# Patient Record
Sex: FEMALE | Race: WHITE | NOT HISPANIC OR LATINO | Employment: FULL TIME | URBAN - NONMETROPOLITAN AREA
[De-identification: names, ages, dates, MRNs, and addresses within clinical notes are randomized per-mention and may not be internally consistent; named-entity substitution may affect disease eponyms.]

---
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
## 2022-03-17 ENCOUNTER — HOSPITAL ENCOUNTER (EMERGENCY)
Facility: HOSPITAL | Age: 22
Discharge: HOME | End: 2022-03-17
Attending: EMERGENCY MEDICINE | Admitting: EMERGENCY MEDICINE
Payer: COMMERCIAL

## 2022-03-17 VITALS
HEART RATE: 101 BPM | DIASTOLIC BLOOD PRESSURE: 82 MMHG | OXYGEN SATURATION: 98 % | TEMPERATURE: 98.2 F | RESPIRATION RATE: 18 BRPM | SYSTOLIC BLOOD PRESSURE: 138 MMHG | WEIGHT: 191.8 LBS | HEIGHT: 68 IN | BODY MASS INDEX: 29.07 KG/M2

## 2022-03-17 DIAGNOSIS — K08.89 DENTALGIA: Primary | ICD-10-CM

## 2022-03-17 PROCEDURE — 99284 EMERGENCY DEPT VISIT MOD MDM: CPT | Performed by: EMERGENCY MEDICINE

## 2022-03-17 PROCEDURE — 99282 EMERGENCY DEPT VISIT SF MDM: CPT

## 2022-03-17 RX ORDER — AMOXICILLIN AND CLAVULANATE POTASSIUM 875; 125 MG/1; MG/1
1 TABLET, FILM COATED ORAL ONCE
Status: COMPLETED | OUTPATIENT
Start: 2022-03-17 | End: 2022-03-17

## 2022-03-17 RX ORDER — CHLORHEXIDINE GLUCONATE 0.12 MG/ML
15 RINSE ORAL 2 TIMES DAILY
Qty: 473 ML | Refills: 0 | Status: SHIPPED | OUTPATIENT
Start: 2022-03-17 | End: 2022-05-19

## 2022-03-17 RX ORDER — AMOXICILLIN AND CLAVULANATE POTASSIUM 875; 125 MG/1; MG/1
1 TABLET, FILM COATED ORAL EVERY 12 HOURS
Qty: 14 TABLET | Refills: 0 | Status: SHIPPED | OUTPATIENT
Start: 2022-03-17 | End: 2022-03-24

## 2022-03-17 RX ADMIN — AMOXICILLIN AND CLAVULANATE POTASSIUM 1 TABLET: 875; 125 TABLET, FILM COATED ORAL at 15:30

## 2022-03-17 NOTE — ED PROVIDER NOTES
History  Chief Complaint   Patient presents with    Dental Pain     patient states her wisdom teeth are starting to come in  patient was feeling her gum when she felt "a blister pop" Called dentist, sent to the ER     61-year-old female presents for evaluation of left lower dental pain  Patient reports the last few days she has had pain in this area, she states she is getting her wisdom teeth in  Patient noticed skin like material over her tooth in that area, she states yesterday she felt like this skin ruptured she had a passing bad taste in her mouth  Today pain persisted and is now radiating up and down  Patient called her dentist to was unable to see her for an emergency visit and recommended going to the emergency department  Patient reports difficulty eating on that side due to pain she denies external swelling or skin discolorations  Patient further denies difficulty swallowing, moving her neck, fevers or chills, nausea or vomiting  Prior to Admission Medications   Prescriptions Last Dose Informant Patient Reported? Taking?   desoximetasone (TOPICORT) 0 05 % cream   No No   Sig: Apply topically 2 (two) times a day   Patient not taking: Reported on 10/19/2021   drospirenone-ethinyl estradiol (HOLGER) 3-0 02 MG per tablet   Yes No   meloxicam (MOBIC) 15 mg tablet   No No   Sig: Take 1 tablet (15 mg total) by mouth daily   Patient not taking: Reported on 10/19/2021   methocarbamol (ROBAXIN) 500 mg tablet   No No   Sig: Take 1 tablet (500 mg total) by mouth 3 (three) times a day as needed for muscle spasms   methylPREDNISolone 4 MG tablet therapy pack   No No   Sig: Use as directed on package   Patient not taking: Reported on 11/14/2021       Facility-Administered Medications: None       History reviewed  No pertinent past medical history  History reviewed  No pertinent surgical history  History reviewed  No pertinent family history    I have reviewed and agree with the history as documented  E-Cigarette/Vaping    E-Cigarette Use Never User      E-Cigarette/Vaping Substances    Nicotine No     THC No     CBD No     Flavoring No     Other No     Unknown No      Social History     Tobacco Use    Smoking status: Never Smoker    Smokeless tobacco: Never Used   Vaping Use    Vaping Use: Never used   Substance Use Topics    Alcohol use: Yes     Comment: socially    Drug use: Never       Review of Systems    Physical Exam  Physical Exam  Vitals reviewed  Constitutional:       General: She is not in acute distress  Appearance: Normal appearance  She is not ill-appearing, toxic-appearing or diaphoretic  HENT:      Head: Normocephalic and atraumatic  Right Ear: External ear normal       Left Ear: External ear normal       Mouth/Throat:      Lips: Pink  Mouth: Mucous membranes are moist       Dentition: Dental tenderness present  No dental caries or dental abscesses  Tongue: No lesions  Palate: No mass and lesions  Pharynx: Oropharynx is clear  Uvula midline  No pharyngeal swelling, oropharyngeal exudate, posterior oropharyngeal erythema or uvula swelling  Tonsils: No tonsillar exudate or tonsillar abscesses  Comments: Tenderness to left lower molar, gum intrusion to middle of tooth from posterior as seen on right tooth; no palpable or visible abscess; soft sublingual and submandibular areas; no trismus  Eyes:      General:         Right eye: No discharge  Left eye: No discharge  Extraocular Movements: Extraocular movements intact  Cardiovascular:      Rate and Rhythm: Normal rate  Pulmonary:      Effort: Pulmonary effort is normal  No respiratory distress  Musculoskeletal:         General: No deformity or signs of injury  Cervical back: No rigidity or tenderness  Right lower leg: No edema  Left lower leg: No edema  Lymphadenopathy:      Cervical: No cervical adenopathy  Skin:     General: Skin is warm  Coloration: Skin is not jaundiced or pale  Neurological:      General: No focal deficit present  Mental Status: She is alert  Mental status is at baseline  Vital Signs  ED Triage Vitals [03/17/22 1459]   Temperature Pulse Respirations Blood Pressure SpO2   98 2 °F (36 8 °C) 101 18 138/82 98 %      Temp Source Heart Rate Source Patient Position - Orthostatic VS BP Location FiO2 (%)   Temporal Monitor Sitting Right arm --      Pain Score       5           Vitals:    03/17/22 1459   BP: 138/82   Pulse: 101   Patient Position - Orthostatic VS: Sitting         Visual Acuity      ED Medications  Medications   amoxicillin-clavulanate (AUGMENTIN) 875-125 mg per tablet 1 tablet (1 tablet Oral Given 3/17/22 1530)       Diagnostic Studies  Results Reviewed     None                 No orders to display              Procedures  Procedures         ED Course                                             MDM  Number of Diagnoses or Management Options  Dentalgia  Diagnosis management comments: 23Year old female presents for evaluation of dentalgia  Patient is well-appearing on examination, no palpable or visual abscess  Disposition  Final diagnoses:   Cande Confer     Time reflects when diagnosis was documented in both MDM as applicable and the Disposition within this note     Time User Action Codes Description Comment    3/17/2022  3:20 PM Marguerite Crawford Add [F95 [id number]] Cande Confer       ED Disposition     ED Disposition Condition Date/Time Comment    Discharge Stable Thu Mar 17, 2022  3:20 PM Anthony Rogers discharge to home/self care              Follow-up Information    None         Discharge Medication List as of 3/17/2022  3:21 PM      START taking these medications    Details   amoxicillin-clavulanate (AUGMENTIN) 875-125 mg per tablet Take 1 tablet by mouth every 12 (twelve) hours for 7 days, Starting Thu 3/17/2022, Until Thu 3/24/2022, Normal      chlorhexidine (PERIDEX) 0 12 % solution Apply 15 mL to the mouth or throat 2 (two) times a day, Starting u 3/17/2022, Normal         CONTINUE these medications which have NOT CHANGED    Details   desoximetasone (TOPICORT) 0 05 % cream Apply topically 2 (two) times a day, Starting Mon 4/5/2021, Normal      drospirenone-ethinyl estradiol (HOLGER) 3-0 02 MG per tablet Starting Thu 11/19/2020, Historical Med      meloxicam (MOBIC) 15 mg tablet Take 1 tablet (15 mg total) by mouth daily, Starting Mon 4/5/2021, Normal      methocarbamol (ROBAXIN) 500 mg tablet Take 1 tablet (500 mg total) by mouth 3 (three) times a day as needed for muscle spasms, Starting Sun 11/14/2021, Normal      methylPREDNISolone 4 MG tablet therapy pack Use as directed on package, Normal             No discharge procedures on file      PDMP Review     None          ED Provider  Electronically Signed by           Alison Paredes DO  03/17/22 6236

## 2022-05-19 ENCOUNTER — OFFICE VISIT (OUTPATIENT)
Dept: FAMILY MEDICINE CLINIC | Facility: CLINIC | Age: 22
End: 2022-05-19
Payer: COMMERCIAL

## 2022-05-19 VITALS
TEMPERATURE: 98.8 F | SYSTOLIC BLOOD PRESSURE: 142 MMHG | WEIGHT: 195.2 LBS | DIASTOLIC BLOOD PRESSURE: 80 MMHG | OXYGEN SATURATION: 97 % | HEART RATE: 97 BPM | RESPIRATION RATE: 16 BRPM | BODY MASS INDEX: 29.58 KG/M2 | HEIGHT: 68 IN

## 2022-05-19 DIAGNOSIS — M54.2 NECK PAIN, CHRONIC: ICD-10-CM

## 2022-05-19 DIAGNOSIS — Z30.41 ENCOUNTER FOR SURVEILLANCE OF CONTRACEPTIVE PILLS: ICD-10-CM

## 2022-05-19 DIAGNOSIS — Z23 IMMUNIZATION DUE: ICD-10-CM

## 2022-05-19 DIAGNOSIS — M54.6 CHRONIC MIDLINE THORACIC BACK PAIN: ICD-10-CM

## 2022-05-19 DIAGNOSIS — Z11.4 SCREENING FOR HIV WITHOUT PRESENCE OF RISK FACTORS: ICD-10-CM

## 2022-05-19 DIAGNOSIS — Z00.00 ANNUAL PHYSICAL EXAM: ICD-10-CM

## 2022-05-19 DIAGNOSIS — G89.29 NECK PAIN, CHRONIC: ICD-10-CM

## 2022-05-19 DIAGNOSIS — L98.9 SKIN LESION: ICD-10-CM

## 2022-05-19 DIAGNOSIS — Z11.59 ENCOUNTER FOR HEPATITIS C SCREENING TEST FOR LOW RISK PATIENT: ICD-10-CM

## 2022-05-19 DIAGNOSIS — M42.00 SCHEUERMANN'S DISEASE: ICD-10-CM

## 2022-05-19 DIAGNOSIS — Z11.8 ENCOUNTER FOR SCREENING EXAMINATION FOR CHLAMYDIAL INFECTION: Primary | ICD-10-CM

## 2022-05-19 DIAGNOSIS — G89.29 CHRONIC MIDLINE THORACIC BACK PAIN: ICD-10-CM

## 2022-05-19 PROCEDURE — T1015 CLINIC SERVICE: HCPCS | Performed by: FAMILY MEDICINE

## 2022-05-19 RX ORDER — AMOXICILLIN 500 MG/1
500 TABLET, FILM COATED ORAL 3 TIMES DAILY
COMMUNITY
Start: 2022-03-25 | End: 2022-05-19

## 2022-05-19 RX ORDER — ACETAMINOPHEN AND CODEINE PHOSPHATE 300; 30 MG/1; MG/1
1 TABLET ORAL EVERY 4 HOURS PRN
COMMUNITY
Start: 2022-03-25 | End: 2022-05-19

## 2022-05-19 RX ORDER — IBUPROFEN 600 MG/1
600 TABLET ORAL EVERY 6 HOURS PRN
COMMUNITY
Start: 2022-03-25 | End: 2022-05-19

## 2022-05-19 NOTE — PROGRESS NOTES
SBAR shift report received from [facility]. Pt stable. Assessment complete. Pt is lying in bed, resting quietly. Resp even, unlabored. Pt is alert, orient X 3. Pt appears in no acute distress at this time. Pt is on 4L high flow nasal cannula 02. Pt has wound vac to R upper chest on back side. No visible lice noted this AM when looking through pt's head. Pt encouraged to call for assistance, call light in reach. Safety measures in place.  Will continue to monitor [employer]    NAME: Blaze Galan  AGE: 23 y o  SEX: female  : 2000     DATE: 2022     Assessment and Plan:     Problem List Items Addressed This Visit    None     Visit Diagnoses     Encounter for screening examination for chlamydial infection    -  Primary    Relevant Orders    Chlamydia/GC amplified DNA by PCR    Screening for HIV without presence of risk factors        Relevant Orders    HIV 1/2 ANTIGEN/ANTIBODY (4TH GENERATION) W REFLEX [facility]    Encounter for hepatitis C screening test for low risk patient        Relevant Orders    Hepatitis C antibody    Immunization due        Relevant Orders    HPV VACCINE BIVALENT 3 DOSE IM (Completed)    Annual physical exam        BMI 29 0-29 9,adult        Scheuermann's disease        Relevant Orders    Ambulatory Referral to Orthopedic Surgery    Ambulatory Referral to Physical Therapy    Chronic midline thoracic back pain        Relevant Orders    Ambulatory Referral to Orthopedic Surgery    Ambulatory Referral to Physical Therapy    Encounter for surveillance of contraceptive pills        Relevant Orders    Ambulatory Referral to Gynecology    Skin lesion        Relevant Orders    Ambulatory Referral to Dermatology    Neck pain, chronic        Relevant Orders    Ambulatory Referral to Physical Therapy        -Reviewed conservative treatment for neck/back pain  Recommend PT and ortho eval for scheuermann's disese   -Labs/urine as ordered  -HPV #1 given today   -RTC 2 months for HPV #2 and follow up of back/neck pain or sooner if concerns arise    Immunizations and preventive care screenings were discussed with patient today  Appropriate education was printed on patient's after visit summary  Counseling:  Alcohol/drug use: discussed moderation in alcohol intake, the recommendations for healthy alcohol use, and avoidance of illicit drug use    Dental Health: discussed importance of regular tooth brushing, flossing, and dental visits  Injury prevention: discussed safety/seat belts, safety helmets, smoke detectors, carbon dioxide detectors, and smoking near bedding or upholstery  Sexual health: discussed sexually transmitted diseases, partner selection, use of condoms, avoidance of unintended pregnancy, and contraceptive alternatives  · Exercise: the importance of regular exercise/physical activity was discussed  Recommend exercise 3-5 times per week for at least 30 minutes  BMI Counseling: Body mass index is 29 68 kg/m²  The BMI is above normal  Nutrition recommendations include decreasing portion sizes, encouraging healthy choices of fruits and vegetables, decreasing fast food intake, consuming healthier snacks, limiting drinks that contain sugar, moderation in carbohydrate intake, increasing intake of lean protein, reducing intake of saturated and trans fat and reducing intake of cholesterol  Exercise recommendations include moderate physical activity 150 minutes/week, exercising 3-5 times per week and obtaining a gym membership  Rationale for BMI follow-up plan is due to patient being overweight or obese  Depression Screening and Follow-up Plan: Patient was screened for depression during today's encounter  They screened negative with a PHQ-2 score of 0  Return in 1 year (on 5/19/2023)  Chief Complaint:     Chief Complaint   Patient presents with    Physical Exam     Establish care with practice      History of Present Illness:     Adult Annual Physical   Patient here for a comprehensive physical exam  The patient reports on going neck and back pain    Notes this has stemmed from an [address] in oct 2021  Notes being prescribed multiple NSAIDs, which do provided some relief, however, she is hesitant with taking medications  Had XRs done in nov 2021 with concern for Scheuermann's disease   She reports most of her pain is in the thoracis spine/ribs and right side of her neck  Requests referral to GYN for management of OCPS  States she is currently getting through an online provider  Requests referral for dermatology for evaluation/removal of a lesion to her right elbow  Notes she fell in January 2021 and developed a scar, however, over the past few weeks as noticed small white blisters appearing on it  Previously saw [facility] in [facility], requesting referral      Diet and Physical Activity  · Diet/Nutrition: well balanced diet  · Exercise: 5-7 times a week on average and 30-60 minutes on average  Depression Screening  PHQ-2/9 Depression Screening    Little interest or pleasure in doing things: 0 - not at all  Feeling down, depressed, or hopeless: 0 - not at all  PHQ-2 Score: 0  PHQ-2 Interpretation: Negative depression screen       General Health  · Sleep: sleeps well  · Hearing: normal - bilateral   · Vision: goes for regular eye exams, wears glasses, wears contacts and follows with eye doctor at [facility]  · Dental: has appointment with [facility] Dec 2022  brushes teeth  /GYN Health  · Last menstrual period: 5/19/2022  · Contraceptive method: oral contraceptives  · History of STDs?: no   · Sexually active, uses condoms  Review of Systems:     Review of Systems   Constitutional: Negative  HENT: Negative  Respiratory: Negative  Cardiovascular: Negative  Gastrointestinal: Negative  Musculoskeletal: Positive for back pain and neck pain  Negative for arthralgias and myalgias  Skin: Positive for wound  Neurological: Negative  Psychiatric/Behavioral: Negative  Past Medical History:     Past Medical History:   Diagnosis Date    Allergic     Back pain     Neck pain       Past Surgical History:     History reviewed  No pertinent surgical history     Social History:     Social History     Socioeconomic History    Marital status: Single     Spouse name: None    Number of children: None    Years of education: None    Highest education level: None   Occupational History    None   Tobacco Use    Smoking status: Never Smoker    Smokeless tobacco: Never Used   Vaping Use    Vaping Use: Never used   Substance and Sexual Activity    Alcohol use: Yes     Comment: socially    Drug use: Never    Sexual activity: Yes     Partners: Male   Other Topics Concern    None   Social History Narrative    None     Social Determinants of Health     Financial Resource Strain: Not on file   Food Insecurity: Not on file   Transportation Needs: Not on file   Physical Activity: Not on file   Stress: Not on file   Social Connections: Not on file   Intimate Partner Violence: Not on file   Housing Stability: Not on file      Family History:     History reviewed  No pertinent family history  Current Medications:     Current Outpatient Medications   Medication Sig Dispense Refill    drospirenone-ethinyl estradiol (HOLGER) 3-0 02 MG per tablet        No current facility-administered medications for this visit  Allergies:     No Known Allergies   Physical Exam:     /80   Pulse 97   Temp 98 8 °F (37 1 °C)   Resp 16   Ht 5' 8" (1 727 m)   Wt 88 5 kg (195 lb 3 2 oz)   SpO2 97%   BMI 29 68 kg/m²     Physical Exam  Vitals and nursing note reviewed  Constitutional:       General: She is not in acute distress  Appearance: Normal appearance  She is well-developed  She is not ill-appearing  HENT:      Head: Normocephalic and atraumatic  Eyes:      Conjunctiva/sclera: Conjunctivae normal    Cardiovascular:      Rate and Rhythm: Normal rate and regular rhythm  Pulses: Normal pulses  Heart sounds: No murmur heard  Pulmonary:      Effort: Pulmonary effort is normal  No respiratory distress  Breath sounds: Normal breath sounds  No stridor  No wheezing, rhonchi or rales  Abdominal:      Palpations: Abdomen is soft  There is no mass  Tenderness: There is no abdominal tenderness  Hernia: No hernia is present  Musculoskeletal:      Cervical back: Neck supple  Spasms and tenderness present  No bony tenderness  Thoracic back: Normal       Lumbar back: Normal         Back:       Right lower leg: No edema  Left lower leg: No edema  Skin:     General: Skin is warm and dry  Capillary Refill: Capillary refill takes less than 2 seconds  Neurological:      Mental Status: She is alert and oriented to person, place, and time  Mental status is at baseline  Psychiatric:         Mood and Affect: Mood normal          Behavior: Behavior normal          Thought Content:  Thought content normal          Judgment: Judgment normal           Nichole Cuevas PA-C   [address]

## 2022-05-19 NOTE — PATIENT INSTRUCTIONS

## 2022-06-09 ENCOUNTER — TELEPHONE (OUTPATIENT)
Dept: OBGYN CLINIC | Facility: CLINIC | Age: 22
End: 2022-06-09

## 2022-06-10 ENCOUNTER — OFFICE VISIT (OUTPATIENT)
Dept: OBGYN CLINIC | Facility: CLINIC | Age: 22
End: 2022-06-10
Payer: COMMERCIAL

## 2022-06-10 ENCOUNTER — APPOINTMENT (OUTPATIENT)
Dept: RADIOLOGY | Facility: CLINIC | Age: 22
End: 2022-06-10
Payer: COMMERCIAL

## 2022-06-10 VITALS
TEMPERATURE: 98 F | DIASTOLIC BLOOD PRESSURE: 83 MMHG | WEIGHT: 191 LBS | HEART RATE: 106 BPM | HEIGHT: 68 IN | SYSTOLIC BLOOD PRESSURE: 121 MMHG | BODY MASS INDEX: 28.95 KG/M2

## 2022-06-10 DIAGNOSIS — G89.29 CHRONIC MIDLINE THORACIC BACK PAIN: ICD-10-CM

## 2022-06-10 DIAGNOSIS — M54.2 CERVICALGIA: ICD-10-CM

## 2022-06-10 DIAGNOSIS — M54.50 CHRONIC RIGHT-SIDED LOW BACK PAIN WITHOUT SCIATICA: ICD-10-CM

## 2022-06-10 DIAGNOSIS — G89.29 CHRONIC BILATERAL THORACIC BACK PAIN: ICD-10-CM

## 2022-06-10 DIAGNOSIS — G89.29 CHRONIC RIGHT-SIDED LOW BACK PAIN WITHOUT SCIATICA: ICD-10-CM

## 2022-06-10 DIAGNOSIS — M54.6 CHRONIC BILATERAL THORACIC BACK PAIN: ICD-10-CM

## 2022-06-10 DIAGNOSIS — M54.6 CHRONIC MIDLINE THORACIC BACK PAIN: ICD-10-CM

## 2022-06-10 DIAGNOSIS — S19.9XXA INJURY OF NECK, INITIAL ENCOUNTER: ICD-10-CM

## 2022-06-10 DIAGNOSIS — S39.92XA INJURY OF LOW BACK, INITIAL ENCOUNTER: ICD-10-CM

## 2022-06-10 PROCEDURE — 1036F TOBACCO NON-USER: CPT | Performed by: FAMILY MEDICINE

## 2022-06-10 PROCEDURE — 3008F BODY MASS INDEX DOCD: CPT | Performed by: FAMILY MEDICINE

## 2022-06-10 PROCEDURE — 72070 X-RAY EXAM THORAC SPINE 2VWS: CPT

## 2022-06-10 PROCEDURE — 99244 OFF/OP CNSLTJ NEW/EST MOD 40: CPT | Performed by: FAMILY MEDICINE
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
## 2022-06-16 ENCOUNTER — OFFICE VISIT (OUTPATIENT)
Dept: DENTISTRY | Facility: CLINIC | Age: 22
End: 2022-06-16
Payer: COMMERCIAL

## 2022-06-16 VITALS — DIASTOLIC BLOOD PRESSURE: 87 MMHG | SYSTOLIC BLOOD PRESSURE: 129 MMHG | HEART RATE: 83 BPM

## 2022-06-16 DIAGNOSIS — K02.9 TOOTH DECAY: Primary | ICD-10-CM

## 2022-06-16 PROCEDURE — D0210 INTRAORAL - COMPLETE SERIES OF RADIOGRAPHIC IMAGES: HCPCS | Performed by: STUDENT IN AN ORGANIZED HEALTH CARE EDUCATION/TRAINING PROGRAM

## 2022-06-16 PROCEDURE — D0150 COMPREHENSIVE ORAL EVALUATION - NEW OR ESTABLISHED PATIENT: HCPCS | Performed by: STUDENT IN AN ORGANIZED HEALTH CARE EDUCATION/TRAINING PROGRAM

## 2022-06-16 NOTE — PROGRESS NOTES
Comprehensive Exam    Marybeth Muro presents for a comprehensive exam  Verbal consent for treatment given in addition to the forms  Reviewed health history - Patient is ASA    Consents signed: Yes    Perio: Gingivitis  Pain Scale: 0  Caries Assessment: high  Radiographs: [facility]    Oral Hygiene instruction reviewed and given  Hygiene recall visits recommended to the patient  Treatment Plan:  1  Periodontal therapy: prophy  2  Caries control: resins, diet change (talked about less soda consumption, less snacking, lowering exposure time to sugary drinks), using prevident  Prognosis is Good    Referrals needed: No  Next visit: Jeremiah

## 2022-06-21 ENCOUNTER — EVALUATION (OUTPATIENT)
Dept: PHYSICAL THERAPY | Facility: HOME HEALTHCARE | Age: 22
End: 2022-06-21
Payer: COMMERCIAL

## 2022-06-21 DIAGNOSIS — S19.9XXA UNSPECIFIED INJURY OF NECK, INITIAL ENCOUNTER: ICD-10-CM

## 2022-06-21 DIAGNOSIS — M54.2 CERVICALGIA: ICD-10-CM

## 2022-06-21 DIAGNOSIS — M54.50 LOW BACK PAIN, UNSPECIFIED BACK PAIN LATERALITY, UNSPECIFIED CHRONICITY, UNSPECIFIED WHETHER SCIATICA PRESENT: Primary | ICD-10-CM

## 2022-06-21 DIAGNOSIS — G89.29 OTHER CHRONIC PAIN: ICD-10-CM

## 2022-06-21 DIAGNOSIS — S39.92XA UNSPECIFIED INJURY OF LOWER BACK, INITIAL ENCOUNTER: ICD-10-CM

## 2022-06-21 DIAGNOSIS — M54.6 PAIN IN THORACIC SPINE: ICD-10-CM

## 2022-06-21 PROCEDURE — 97112 NEUROMUSCULAR REEDUCATION: CPT

## 2022-06-21 PROCEDURE — 97162 PT EVAL MOD COMPLEX 30 MIN: CPT

## 2022-06-21 PROCEDURE — 97140 MANUAL THERAPY 1/> REGIONS: CPT
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
## 2022-06-28 ENCOUNTER — APPOINTMENT (OUTPATIENT)
Dept: PHYSICAL THERAPY | Facility: HOME HEALTHCARE | Age: 22
End: 2022-06-28
Payer: COMMERCIAL

## 2022-07-14 ENCOUNTER — OFFICE VISIT (OUTPATIENT)
Dept: DENTISTRY | Facility: CLINIC | Age: 22
End: 2022-07-14
Payer: COMMERCIAL

## 2022-07-14 VITALS — SYSTOLIC BLOOD PRESSURE: 146 MMHG | HEART RATE: 90 BPM | DIASTOLIC BLOOD PRESSURE: 84 MMHG

## 2022-07-14 DIAGNOSIS — K02.9 TOOTH DECAY: Primary | ICD-10-CM

## 2022-07-14 PROCEDURE — D2393 RESIN-BASED COMPOSITE - 3 SURFACES, POSTERIOR: HCPCS | Performed by: STUDENT IN AN ORGANIZED HEALTH CARE EDUCATION/TRAINING PROGRAM

## 2022-07-14 PROCEDURE — D2392 RESIN-BASED COMPOSITE - 2 SURFACES, POSTERIOR: HCPCS | Performed by: STUDENT IN AN ORGANIZED HEALTH CARE EDUCATION/TRAINING PROGRAM

## 2022-07-14 PROCEDURE — D2391 RESIN-BASED COMPOSITE - 1 SURFACE, POSTERIOR: HCPCS | Performed by: STUDENT IN AN ORGANIZED HEALTH CARE EDUCATION/TRAINING PROGRAM

## 2022-07-14 NOTE — PROGRESS NOTES
Composite Filling    Media Andrea presents for composite filling  PMH reviewed, no changes  Discussed with patient need for RCT if pulp exposure occurs or in future if pulp is inflamed  Pt understands and consents  Applied topical benzocaine, administered 2 carps 2% lido 1:100k epi via IANB and long buccal and 1 carps 4% articaine 1:100k epi via infilatration    Prepped tooth #2, 3, 4, 31, 30, 29 with 556 carbide on high speed  Caries removed with round carbide on slow speed  Placed sectional matrix  Isolation with cotton rolls and dri-angles    Etch with 37% H2PO4, rinse, dry  Applied Adhese with 20 second scrub once, gentle air dry and light cured for 10s  Restored with Tetric bulk abdi and beautifil flowable resin shade A2 and light cured  Refined with finishing burs, polished with enhance point  Verified occlusion and contacts  Pt left satisfied      NV: resins

## 2022-08-02 ENCOUNTER — APPOINTMENT (OUTPATIENT)
Dept: LAB | Facility: HOSPITAL | Age: 22
End: 2022-08-02
Payer: COMMERCIAL

## 2022-08-02 DIAGNOSIS — Z11.4 SCREENING FOR HIV WITHOUT PRESENCE OF RISK FACTORS: ICD-10-CM

## 2022-08-02 DIAGNOSIS — Z11.8 ENCOUNTER FOR SCREENING EXAMINATION FOR CHLAMYDIAL INFECTION: ICD-10-CM

## 2022-08-02 DIAGNOSIS — Z11.59 ENCOUNTER FOR HEPATITIS C SCREENING TEST FOR LOW RISK PATIENT: ICD-10-CM

## 2022-08-02 LAB — HCV AB SER QL: NORMAL

## 2022-08-02 PROCEDURE — 87591 N.GONORRHOEAE DNA AMP PROB: CPT

## 2022-08-02 PROCEDURE — 87491 CHLMYD TRACH DNA AMP PROBE: CPT

## 2022-08-02 PROCEDURE — 87389 HIV-1 AG W/HIV-1&-2 AB AG IA: CPT

## 2022-08-02 PROCEDURE — 86803 HEPATITIS C AB TEST: CPT

## 2022-08-02 PROCEDURE — 36415 COLL VENOUS BLD VENIPUNCTURE: CPT

## 2022-08-03 DIAGNOSIS — A74.9 CHLAMYDIA INFECTION: Primary | ICD-10-CM

## 2022-08-03 LAB
C TRACH DNA SPEC QL NAA+PROBE: POSITIVE
HIV 1+2 AB+HIV1 P24 AG SERPL QL IA: NORMAL
N GONORRHOEA DNA SPEC QL NAA+PROBE: NEGATIVE

## 2022-08-03 RX ORDER — DOXYCYCLINE HYCLATE 100 MG/1
100 CAPSULE ORAL EVERY 12 HOURS SCHEDULED
Qty: 14 CAPSULE | Refills: 0 | Status: SHIPPED | OUTPATIENT
Start: 2022-08-03 | End: 2022-08-10

## 2022-12-19 ENCOUNTER — OFFICE VISIT (OUTPATIENT)
Dept: DENTISTRY | Facility: CLINIC | Age: 22
End: 2022-12-19

## 2022-12-19 VITALS — DIASTOLIC BLOOD PRESSURE: 81 MMHG | SYSTOLIC BLOOD PRESSURE: 121 MMHG | HEART RATE: 80 BPM

## 2022-12-19 DIAGNOSIS — K02.9 TOOTH DECAY: Primary | ICD-10-CM

## 2022-12-19 NOTE — PROGRESS NOTES
Composite Filling    Denisenorma Bowers presents for composite filling  PMH reviewed, no changes  Discussed with patient need for RCT if pulp exposure occurs or in future if pulp is inflamed  Pt understands and consents  Applied topical benzocaine, administered 1 carps 2% lido 1:100k epi via IANB and long buccal    Prepped tooth #19, 18, 20 with 330 carbide on high speed  Caries removed with round carbide on slow speed  #19 buccal was deep  Isolation with cotton rolls and dri-angles    Etch with 37% H2PO4, rinse, dry  Applied Adhese with 20 second scrub once, gentle air dry and light cured for 10s  Restored with Tetric bulk abdi and beautifil flowable resin shade A2 and light cured  Refined with finishing burs, polished with enhance point  Verified occlusion and contacts  Pt left satisfied      NV: Resins

## 2022-12-20 ENCOUNTER — OFFICE VISIT (OUTPATIENT)
Dept: DENTISTRY | Facility: CLINIC | Age: 22
End: 2022-12-20

## 2022-12-20 VITALS — DIASTOLIC BLOOD PRESSURE: 80 MMHG | SYSTOLIC BLOOD PRESSURE: 124 MMHG

## 2022-12-20 DIAGNOSIS — K03.6 ACCRETIONS ON TEETH: Primary | ICD-10-CM

## 2022-12-20 NOTE — PROGRESS NOTES
Adult Prophy  Chief Complaint   Patient presents with   • Routine Oral Cleaning   Discussed sensitivity- pt would like to try paste but if it does not work we will try a Fl2 tx NV     Method Used:  · Prophy Method Used: Ultrasonic Scaling  · Hand Scaling  · Polished  · Flossed    Radiographs Taken in [facility]: (Taken to assess periodontal health)  · None     Intra/Extra Oral Cancer Screening:  · Within normal limits    Oral Hygiene:  · Fair    Plaque:  · Light    Calculus:  · Light  · Moderate  · Lower anteriors    Bleeding:  · Moderate    Stain:  · Light    Periodontal Charting:  · Spot probing    Periodontal Classification:  · Generalized  · Moderate  · Gingivitis    Nutritional Counseling:  · Discussed pH and the role it plays in decay    Oral Hygiene Instruction:    Went over daily routine and c-shaped flossing  Demonstrated in the mirror     Discussed whitening     No orders of the defined types were placed in this encounter         Next Visit:  6 Month prophy  Dentist visit- resins

## 2022-12-22 ENCOUNTER — OFFICE VISIT (OUTPATIENT)
Dept: DENTISTRY | Facility: CLINIC | Age: 22
End: 2022-12-22

## 2022-12-22 VITALS — SYSTOLIC BLOOD PRESSURE: 119 MMHG | HEART RATE: 73 BPM | DIASTOLIC BLOOD PRESSURE: 81 MMHG

## 2022-12-22 DIAGNOSIS — K02.9 TOOTH DECAY: Primary | ICD-10-CM

## 2022-12-22 NOTE — PROGRESS NOTES
Composite Filling    Aydee Kinsey presents for composite filling  PMH reviewed, no changes  Discussed with patient need for RCT if pulp exposure occurs or in future if pulp is inflamed  Pt understands and consents  Applied topical benzocaine, administered 1 carps 4% articaine 1:100k epi via infiltration    Prepped tooth #12, 14 with 330 carbide on high speed  Caries removed with round carbide on slow speed  Placed palodent  Isolation with cotton rolls and dri-angles    Etch with 37% H2PO4, rinse, dry  Applied Adhese with 20 second scrub once, gentle air dry and light cured for 10s  Restored with Tetric bulk abdi shade A2 and light cured  Refined with finishing burs, polished with enhance point  Verified occlusion and contacts  Pt left satisfied        NV: Resins

## 2023-01-12 ENCOUNTER — OFFICE VISIT (OUTPATIENT)
Dept: DENTISTRY | Facility: CLINIC | Age: 23
End: 2023-01-12

## 2023-01-12 VITALS — DIASTOLIC BLOOD PRESSURE: 75 MMHG | HEART RATE: 94 BPM | SYSTOLIC BLOOD PRESSURE: 115 MMHG

## 2023-01-12 DIAGNOSIS — K02.9 TOOTH DECAY: Primary | ICD-10-CM

## 2023-01-12 NOTE — PROGRESS NOTES
Composite Filling    Kimberlyroberta Jon presents for composite filling  PMH reviewed, no changes  Discussed with patient need for RCT if pulp exposure occurs or in future if pulp is inflamed  Pt understands and consents  Applied topical benzocaine, administered 1 carps 4% articaine 1:100k epi via infiltration    Prepped tooth #7, 8, 11 with 330 carbide on high speed  Caries removed with round carbide on slow speed  Placed clear matrix  Isolation with cotton rolls and dri-angles    Etch with 37% H2PO4, rinse, dry  Applied Adhese with 20 second scrub once, gentle air dry and light cured for 10s  Restored with Tetric bulk abdi shade A2 and light cured  Refined with finishing burs, polished with enhance point  Verified occlusion and contacts  Pt left satisfied      NV: Recall

## 2023-03-09 ENCOUNTER — APPOINTMENT (EMERGENCY)
Dept: CT IMAGING | Facility: HOSPITAL | Age: 23
End: 2023-03-09

## 2023-03-09 ENCOUNTER — HOSPITAL ENCOUNTER (EMERGENCY)
Facility: HOSPITAL | Age: 23
Discharge: HOME | End: 2023-03-09
Attending: EMERGENCY MEDICINE | Admitting: EMERGENCY MEDICINE

## 2023-03-09 VITALS
OXYGEN SATURATION: 98 % | RESPIRATION RATE: 18 BRPM | HEART RATE: 85 BPM | TEMPERATURE: 97.8 F | DIASTOLIC BLOOD PRESSURE: 91 MMHG | SYSTOLIC BLOOD PRESSURE: 153 MMHG

## 2023-03-09 DIAGNOSIS — E86.0 DEHYDRATION: ICD-10-CM

## 2023-03-09 DIAGNOSIS — N39.0 UTI (URINARY TRACT INFECTION): ICD-10-CM

## 2023-03-09 DIAGNOSIS — R10.9 ABDOMINAL PAIN: Primary | ICD-10-CM

## 2023-03-09 LAB
ALBUMIN SERPL BCP-MCNC: 4.2 G/DL (ref 3.5–5)
ALP SERPL-CCNC: 58 U/L (ref 34–104)
ALT SERPL W P-5'-P-CCNC: 23 U/L (ref 7–52)
ANION GAP SERPL CALCULATED.3IONS-SCNC: 7 MMOL/L (ref 4–13)
AST SERPL W P-5'-P-CCNC: 15 U/L (ref 13–39)
BACTERIA UR QL AUTO: ABNORMAL /HPF
BASOPHILS # BLD AUTO: 0.05 THOUSANDS/ÂΜL (ref 0–0.1)
BASOPHILS NFR BLD AUTO: 0 % (ref 0–1)
BILIRUB DIRECT SERPL-MCNC: 0.27 MG/DL (ref 0–0.2)
BILIRUB SERPL-MCNC: 1.04 MG/DL (ref 0.2–1)
BILIRUB UR QL STRIP: ABNORMAL
BUN SERPL-MCNC: 8 MG/DL (ref 5–25)
CALCIUM SERPL-MCNC: 9.2 MG/DL (ref 8.4–10.2)
CHLORIDE SERPL-SCNC: 104 MMOL/L (ref 96–108)
CLARITY UR: CLEAR
CO2 SERPL-SCNC: 26 MMOL/L (ref 21–32)
COLOR UR: YELLOW
CREAT SERPL-MCNC: 0.7 MG/DL (ref 0.6–1.3)
D DIMER PPP FEU-MCNC: 0.34 UG/ML FEU
EOSINOPHIL # BLD AUTO: 0.07 THOUSAND/ÂΜL (ref 0–0.61)
EOSINOPHIL NFR BLD AUTO: 1 % (ref 0–6)
ERYTHROCYTE [DISTWIDTH] IN BLOOD BY AUTOMATED COUNT: 11.7 % (ref 11.6–15.1)
EXT PREGNANCY TEST URINE: NEGATIVE
EXT. CONTROL: NORMAL
FLUAV RNA RESP QL NAA+PROBE: NEGATIVE
FLUBV RNA RESP QL NAA+PROBE: NEGATIVE
GFR SERPL CREATININE-BSD FRML MDRD: 124 ML/MIN/1.73SQ M
GLUCOSE SERPL-MCNC: 85 MG/DL (ref 65–140)
GLUCOSE UR STRIP-MCNC: NEGATIVE MG/DL
HCT VFR BLD AUTO: 39.5 % (ref 34.8–46.1)
HGB BLD-MCNC: 13.7 G/DL (ref 11.5–15.4)
HGB UR QL STRIP.AUTO: ABNORMAL
IMM GRANULOCYTES # BLD AUTO: 0.03 THOUSAND/UL (ref 0–0.2)
IMM GRANULOCYTES NFR BLD AUTO: 0 % (ref 0–2)
KETONES UR STRIP-MCNC: ABNORMAL MG/DL
LEUKOCYTE ESTERASE UR QL STRIP: ABNORMAL
LIPASE SERPL-CCNC: <6 U/L (ref 11–82)
LYMPHOCYTES # BLD AUTO: 1.32 THOUSANDS/ÂΜL (ref 0.6–4.47)
LYMPHOCYTES NFR BLD AUTO: 12 % (ref 14–44)
MCH RBC QN AUTO: 30.1 PG (ref 26.8–34.3)
MCHC RBC AUTO-ENTMCNC: 34.7 G/DL (ref 31.4–37.4)
MCV RBC AUTO: 87 FL (ref 82–98)
MONOCYTES # BLD AUTO: 1.09 THOUSAND/ÂΜL (ref 0.17–1.22)
MONOCYTES NFR BLD AUTO: 10 % (ref 4–12)
MUCOUS THREADS UR QL AUTO: ABNORMAL
NEUTROPHILS # BLD AUTO: 8.82 THOUSANDS/ÂΜL (ref 1.85–7.62)
NEUTS SEG NFR BLD AUTO: 77 % (ref 43–75)
NITRITE UR QL STRIP: NEGATIVE
NON-SQ EPI CELLS URNS QL MICRO: ABNORMAL /HPF
NRBC BLD AUTO-RTO: 0 /100 WBCS
PH UR STRIP.AUTO: 6 [PH]
PLATELET # BLD AUTO: 335 THOUSANDS/UL (ref 149–390)
PMV BLD AUTO: 9 FL (ref 8.9–12.7)
POTASSIUM SERPL-SCNC: 3.7 MMOL/L (ref 3.5–5.3)
PROT SERPL-MCNC: 6.7 G/DL (ref 6.4–8.4)
PROT UR STRIP-MCNC: NEGATIVE MG/DL
RBC # BLD AUTO: 4.55 MILLION/UL (ref 3.81–5.12)
RBC #/AREA URNS AUTO: ABNORMAL /HPF
RSV RNA RESP QL NAA+PROBE: NEGATIVE
SARS-COV-2 RNA RESP QL NAA+PROBE: NEGATIVE
SODIUM SERPL-SCNC: 137 MMOL/L (ref 135–147)
SP GR UR STRIP.AUTO: 1.02 (ref 1–1.03)
UROBILINOGEN UR QL STRIP.AUTO: 0.2 E.U./DL
WBC # BLD AUTO: 11.38 THOUSAND/UL (ref 4.31–10.16)
WBC #/AREA URNS AUTO: ABNORMAL /HPF

## 2023-03-09 RX ORDER — KETOROLAC TROMETHAMINE 30 MG/ML
15 INJECTION, SOLUTION INTRAMUSCULAR; INTRAVENOUS ONCE
Status: COMPLETED | OUTPATIENT
Start: 2023-03-09 | End: 2023-03-09

## 2023-03-09 RX ORDER — ONDANSETRON 4 MG/1
4 TABLET, ORALLY DISINTEGRATING ORAL EVERY 8 HOURS PRN
Qty: 20 TABLET | Refills: 0 | Status: SHIPPED | OUTPATIENT
Start: 2023-03-09

## 2023-03-09 RX ORDER — ONDANSETRON 2 MG/ML
4 INJECTION INTRAMUSCULAR; INTRAVENOUS ONCE
Status: COMPLETED | OUTPATIENT
Start: 2023-03-09 | End: 2023-03-09

## 2023-03-09 RX ADMIN — KETOROLAC TROMETHAMINE 15 MG: 30 INJECTION, SOLUTION INTRAMUSCULAR; INTRAVENOUS at 19:04

## 2023-03-09 RX ADMIN — ONDANSETRON 4 MG: 2 INJECTION INTRAMUSCULAR; INTRAVENOUS at 18:29

## 2023-03-09 RX ADMIN — SODIUM CHLORIDE, SODIUM LACTATE, POTASSIUM CHLORIDE, AND CALCIUM CHLORIDE 1000 ML: .6; .31; .03; .02 INJECTION, SOLUTION INTRAVENOUS at 18:29

## 2023-03-09 RX ADMIN — IOHEXOL 100 ML: 350 INJECTION, SOLUTION INTRAVENOUS at 21:28

## 2023-03-09 NOTE — ED PROVIDER NOTES
History  Chief Complaint   Patient presents with   • Abdominal Pain     Non-radiating abdominal pain to the RLQ starting yesterday morning, and worsening since especially when standing up  53-year-old female o/w healthy presents with sharp right upper quadrant pain that started yesterday morning it started shortly after she woke up she was still able to tolerate eating that did not seem to make the pain worse and then gradually worsened throughout the day it is constant but will have a worsening component if she attempts to stand up  Or take a deep breath  She does not feel short of breath there is no history of any chest pain no heartburn no prior history of PE or DVT but feels like at times she cannot take a full breath  She has been nauseated but no vomiting no diarrhea or change in her stools no flank pain she does feel some bilateral lower quadrant abdominal cramping similar to menstrual cramps but she finished her period a week ago it was unremarkable  She denies any upper respiratory complaints such as earache sore throat or cough there is no lightheadedness no trauma or falls no flank pain or back back pain no vaginal discharge or bleeding no prior history of ovarian cysts no dysuria or increased urinary frequency  Patient is on OCP          Prior to Admission Medications   Prescriptions Last Dose Informant Patient Reported? Taking? Clinpro 5000 1 1 % PSTE   No No   Sig: APPLY 1 SQUIRT TO TEETH DAILY AND BRUSH AT BEDTIME  DO NOT RINSE  NOTHING TO EAT OR DRINK AFTERWARDS   drospirenone-ethinyl estradiol (HOLGER) 3-0 02 MG per tablet   Yes No      Facility-Administered Medications: None       Past Medical History:   Diagnosis Date   • Allergic    • Back pain    • Neck pain        History reviewed  No pertinent surgical history  History reviewed  No pertinent family history  I have reviewed and agree with the history as documented      E-Cigarette/Vaping   • E-Cigarette Use Never User E-Cigarette/Vaping Substances   • Nicotine No    • THC No    • CBD No    • Flavoring No    • Other No    • Unknown No      Social History     Tobacco Use   • Smoking status: Never   • Smokeless tobacco: Never   Vaping Use   • Vaping Use: Never used   Substance Use Topics   • Alcohol use: Yes     Comment: socially   • Drug use: Never       Review of Systems   Constitutional: Positive for activity change  Negative for appetite change, chills, fatigue and fever  HENT: Negative for congestion, ear pain, rhinorrhea, sneezing and sore throat  Eyes: Negative for discharge and visual disturbance  Respiratory: Positive for shortness of breath (cannot take deep breath pleurtic)  Negative for cough  Cardiovascular: Negative for chest pain and leg swelling  Gastrointestinal: Positive for abdominal pain and nausea  Negative for abdominal distention, blood in stool, constipation, diarrhea and vomiting  Endocrine: Negative for polyuria  Genitourinary: Negative for difficulty urinating, dysuria, frequency, hematuria, urgency, vaginal bleeding, vaginal discharge and vaginal pain  Musculoskeletal: Negative for arthralgias, back pain and myalgias  Skin: Negative for rash  Neurological: Negative for dizziness, weakness, light-headedness, numbness and headaches  Hematological: Negative for adenopathy  Psychiatric/Behavioral: Negative for confusion  All other systems reviewed and are negative  Physical Exam  Physical Exam  Vitals and nursing note reviewed  Constitutional:       General: She is not in acute distress  Appearance: She is well-developed  She is not ill-appearing, toxic-appearing or diaphoretic  HENT:      Head: Normocephalic and atraumatic  Right Ear: External ear normal       Left Ear: External ear normal       Nose: Nose normal       Mouth/Throat:      Pharynx: No oropharyngeal exudate  Eyes:      General:         Right eye: No discharge  Left eye: No discharge  Conjunctiva/sclera: Conjunctivae normal       Pupils: Pupils are equal, round, and reactive to light  Neck:      Comments: No midline or paraspinous tenderness  Cardiovascular:      Rate and Rhythm: Normal rate and regular rhythm  Heart sounds: Normal heart sounds  Pulmonary:      Effort: Pulmonary effort is normal  No respiratory distress  Breath sounds: Normal breath sounds  No wheezing, rhonchi or rales  Comments: sats 98% RA  Chest:      Chest wall: No tenderness  Abdominal:      General: Bowel sounds are normal  There is no distension  Palpations: Abdomen is soft  Tenderness: There is abdominal tenderness (RUQ greater than RLQ no murphys sign or tenderness over McBurneys point)  There is no right CVA tenderness, left CVA tenderness, guarding or rebound  Comments: Back no midline T or L spine tendneress   Musculoskeletal:         General: No tenderness or deformity  Normal range of motion  Cervical back: Normal range of motion and neck supple  Right lower leg: No edema  Left lower leg: No edema  Skin:     General: Skin is warm and dry  Capillary Refill: Capillary refill takes less than 2 seconds  Neurological:      Mental Status: She is alert and oriented to person, place, and time  Cranial Nerves: No cranial nerve deficit  Sensory: No sensory deficit  Motor: No weakness or abnormal muscle tone        Coordination: Coordination normal       Gait: Gait normal       Comments: Gait steady   Psychiatric:         Mood and Affect: Mood normal          Vital Signs  ED Triage Vitals   Temperature Pulse Respirations Blood Pressure SpO2   03/09/23 1753 03/09/23 1753 03/09/23 1753 03/09/23 1753 03/09/23 1753   97 8 °F (36 6 °C) 95 16 138/90 98 %      Temp Source Heart Rate Source Patient Position - Orthostatic VS BP Location FiO2 (%)   03/09/23 1753 03/09/23 1753 03/09/23 1753 03/09/23 1753 --   Tympanic Monitor Sitting Left arm       Pain Score 03/09/23 1904       7           Vitals:    03/09/23 1753 03/09/23 1830   BP: 138/90 153/91   Pulse: 95 85   Patient Position - Orthostatic VS: Sitting Sitting         Visual Acuity      ED Medications  Medications   ondansetron (ZOFRAN) injection 4 mg (4 mg Intravenous Given 3/9/23 1829)   lactated ringers bolus 1,000 mL (0 mL Intravenous Stopped 3/9/23 2055)   ketorolac (TORADOL) injection 15 mg (15 mg Intravenous Given 3/9/23 1904)   iohexol (OMNIPAQUE) 350 MG/ML injection (SINGLE-DOSE) 100 mL (100 mL Intravenous Given 3/9/23 2128)       Diagnostic Studies  Results Reviewed     Procedure Component Value Units Date/Time    Urine culture [[id number]] Collected: 03/09/23 1927    Lab Status:  In process Specimen: Urine Updated: 03/10/23 6442    Basic metabolic panel [[id number]] Collected: 03/09/23 1908    Lab Status: Final result Specimen: Blood from Arm, Right Updated: 03/09/23 1940     Sodium 137 mmol/L      Potassium 3 7 mmol/L      Chloride 104 mmol/L      CO2 26 mmol/L      ANION GAP 7 mmol/L      BUN 8 mg/dL      Creatinine 0 70 mg/dL      Glucose 85 mg/dL      Calcium 9 2 mg/dL      eGFR 124 ml/min/1 73sq m     Narrative:      [place] guidelines for Chronic Kidney Disease (CKD):   •  Stage 1 with normal or high GFR (GFR > 90 mL/min/1 73 square meters)  •  Stage 2 Mild CKD (GFR = 60-89 mL/min/1 73 square meters)  •  Stage 3A Moderate CKD (GFR = 45-59 mL/min/1 73 square meters)  •  Stage 3B Moderate CKD (GFR = 30-44 mL/min/1 73 square meters)  •  Stage 4 Severe CKD (GFR = 15-29 mL/min/1 73 square meters)  •  Stage 5 End Stage CKD (GFR <15 mL/min/1 73 square meters)  Note: GFR calculation is accurate only with a steady state creatinine    Hepatic function panel [[id number]]  (Abnormal) Collected: 03/09/23 1908    Lab Status: Final result Specimen: Blood from Arm, Right Updated: 03/09/23 1940     Total Bilirubin 1 04 mg/dL      Bilirubin, Direct 0 27 mg/dL      Alkaline Phosphatase 58 U/L      AST 15 U/L      ALT 23 U/L      Total Protein 6 7 g/dL      Albumin 4 2 g/dL     Lipase [[id number]]  (Abnormal) Collected: 03/09/23 1908    Lab Status: Final result Specimen: Blood from Arm, Right Updated: 03/09/23 1940     Lipase <6 u/L     Urine Microscopic [[id number]]  (Abnormal) Collected: 03/09/23 1906    Lab Status: Final result Specimen: Urine, Clean Catch Updated: 03/09/23 1940     RBC, UA 1-2 /hpf      WBC, UA 4-10 /hpf      Epithelial Cells Occasional /hpf      Bacteria, UA Occasional /hpf      MUCUS THREADS Occasional    D-dimer, quantitative [[id number]]  (Normal) Collected: 03/09/23 1908    Lab Status: Final result Specimen: Blood from Arm, Right Updated: 03/09/23 1932     D-Dimer, Quant 0 34 ug/ml FEU     UA w Reflex to Microscopic w Reflex to Culture [[id number]]  (Abnormal) Collected: 03/09/23 1906    Lab Status: Final result Specimen: Urine, Clean Catch Updated: 03/09/23 1918     Color, UA Yellow     Clarity, UA Clear     Specific Gravity, UA 1 025     pH, UA 6 0     Leukocytes, UA Trace     Nitrite, UA Negative     Protein, UA Negative mg/dl      Glucose, UA Negative mg/dl      Ketones, UA 40 (2+) mg/dl      Urobilinogen, UA 0 2 E U /dl      Bilirubin, UA Small     Occult Blood, UA Trace-Intact    CBC and differential [[id number]]  (Abnormal) Collected: 03/09/23 1908    Lab Status: Final result Specimen: Blood from Arm, Right Updated: 03/09/23 1917     WBC 11 38 Thousand/uL      RBC 4 55 Million/uL      Hemoglobin 13 7 g/dL      Hematocrit 39 5 %      MCV 87 fL      MCH 30 1 pg      MCHC 34 7 g/dL      RDW 11 7 %      MPV 9 0 fL      Platelets 611 Thousands/uL      nRBC 0 /100 WBCs      Neutrophils Relative 77 %      Immat GRANS % 0 %      Lymphocytes Relative 12 %      Monocytes Relative 10 %      Eosinophils Relative 1 %      Basophils Relative 0 %      Neutrophils Absolute 8 82 Thousands/µL      Immature Grans Absolute 0 03 Thousand/uL      Lymphocytes Absolute 1 32 Thousands/µL Monocytes Absolute 1 09 Thousand/µL      Eosinophils Absolute 0 07 Thousand/µL      Basophils Absolute 0 05 Thousands/µL     FLU/RSV/COVID - if FLU/RSV clinically relevant [[id number]]  (Normal) Collected: 03/09/23 1830    Lab Status: Final result Specimen: Nares from Nose Updated: 03/09/23 1914     SARS-CoV-2 Negative     INFLUENZA A PCR Negative     INFLUENZA B PCR Negative     RSV PCR Negative    Narrative:      FOR PEDIATRIC PATIENTS - copy/paste COVID Guidelines URL to browser: [email or web address]  [employer]    SARS-CoV-2 assay is a Nucleic Acid Amplification assay intended for the  qualitative detection of nucleic acid from SARS-CoV-2 in nasopharyngeal  swabs  Results are for the presumptive identification of SARS-CoV-2 RNA  Positive results are indicative of infection with SARS-CoV-2, the virus  causing COVID-19, but do not rule out bacterial infection or co-infection  with other viruses  Laboratories within the [place] and its  territories are required to report all positive results to the appropriate  public health authorities  Negative results do not preclude SARS-CoV-2  infection and should not be used as the sole basis for treatment or other  patient management decisions  Negative results must be combined with  clinical observations, patient history, and epidemiological information  This test has not been FDA cleared or approved  This test has been authorized by FDA under an Emergency Use Authorization  (EUA)  This test is only authorized for the duration of time the  declaration that circumstances exist justifying the authorization of the  emergency use of an in vitro diagnostic tests for detection of SARS-CoV-2  virus and/or diagnosis of COVID-19 infection under section 564(b)(1) of  the Act, 21 U  [place] C  483TIS-6(D)(3), unless the authorization is terminated  or revoked sooner   The test has been validated but independent review by FDA  and CLIA is pending  Test performed using [employer] Gene[employer]pert: This RT-PCR assay targets N2,  a region unique to SARS-CoV-2  A conserved region in the E-gene was chosen  for pan-Sarbecovirus detection which includes SARS-CoV-2  According to CMS-2020-01-R, this platform meets the definition of high-throughput technology  POCT pregnancy, urine [[id number]]  (Normal) Resulted: 03/09/23 1904    Lab Status: Final result Updated: 03/09/23 1904     EXT Preg Test, Ur Negative     Control Valid                 CT abdomen pelvis with contrast   Final Result by Benny Lopez MD (03/09 2216)      No evidence of acute intra-abdominal process  Workstation performed: [id number]                    Procedures  Procedures         ED Course  ED Course as of 03/10/23 0321   Thu Mar 09, 2023   2312 Discussed lab work and CT findding with patient provided a copy of lab report now c/o being hungry feels markedly improved  Abm cramps have resovled  No evidence of appendicitis; recommended bland diet plenty of fluids exact etiology of abdm pain not determined but reviewed not c/w  PE or pancreatitis; no urinary complaints secondary to prior hx of pyelo will culture urine    2322 Rt PIV d/ladi by me with catheter intact                                             Medical Decision Making  Mdm: 25 yo female with pleuritic pain RUQ and RLQ abdm pain; will assess for pulmonary embolism; cholecystitis, pancreatitis; appendicitis; no prior hx of ovarian cyst gradual onset of abdm pain less likely hemorrhagic ovarian cyst, torsion; less likely nephrolithiasis initate symptomatic mangement with IVF antiemetics and re-eval    Amount and/or Complexity of Data Reviewed  Labs: ordered  Risk  Prescription drug management            Disposition  Final diagnoses:   Abdominal pain   Dehydration     Time reflects when diagnosis was documented in both MDM as applicable and the Disposition within this note     Time User Action Codes Description Comment    3/9/2023 11:17 PM Laura Zander Add [R10 9] Abdominal pain     3/9/2023 11:17 PM Laura Zander Add [E86 0] Dehydration       ED Disposition     ED Disposition   Discharge    Condition   Stable    Date/Time   Thu Mar 9, 2023 11:17 PM    Comment   Carlita Fernandes discharge to home/self care  Follow-up Information     Follow up With Specialties Details Why Contact Info    Nichole Cuevas PA-C Family Medicine, Physician Assistant Go to  if not improved [address]  [phone]            Discharge Medication List as of 3/9/2023 11:22 PM      START taking these medications    Details   ondansetron (ZOFRAN-ODT) 4 mg disintegrating tablet Take 1 tablet (4 mg total) by mouth every 8 (eight) hours as needed for nausea or vomiting for up to 20 doses, Starting Thu 3/9/2023, Normal         CONTINUE these medications which have NOT CHANGED    Details   Clinpro 5000 1 1 % PSTE APPLY 1 SQUIRT TO TEETH DAILY AND BRUSH AT BEDTIME  DO NOT RINSE  NOTHING TO EAT OR DRINK AFTERWARDS, Normal      drospirenone-ethinyl estradiol (HOLGER) 3-0 02 MG per tablet Starting Thu 11/19/2020, Historical Med             No discharge procedures on file      PDMP Review     None          ED Provider  Electronically Signed by           Qian Mcpherson MD  03/10/23 7936

## 2023-03-10 NOTE — DISCHARGE INSTRUCTIONS
Plenty of fluids - gatorade powerade freeze pops   Famotidine 2-20mg tabs daily to cut stomach acid   Zofran as needed for nausea  Tylenol 650mg every 6 hours as needed for pain, fever (max 3000mg in 24 hours)   Aleve 2 tabs twice daily with food OR ibuprofen 200-800mg every 8 hours with food as needed for pain   Return with fever, inabiltily to keep fliuds down worsen or change in pain or any new or worsening symptoms

## 2023-03-12 LAB — BACTERIA UR CULT: ABNORMAL

## 2023-03-12 RX ORDER — CEPHALEXIN 500 MG/1
500 CAPSULE ORAL EVERY 6 HOURS SCHEDULED
Qty: 28 CAPSULE | Refills: 0 | Status: SHIPPED | OUTPATIENT
Start: 2023-03-12 | End: 2023-03-19

## 2023-03-13 ENCOUNTER — APPOINTMENT (EMERGENCY)
Dept: ULTRASOUND IMAGING | Facility: HOSPITAL | Age: 23
End: 2023-03-13

## 2023-03-13 ENCOUNTER — HOSPITAL ENCOUNTER (EMERGENCY)
Facility: HOSPITAL | Age: 23
Discharge: HOME | End: 2023-03-13
Attending: EMERGENCY MEDICINE | Admitting: EMERGENCY MEDICINE

## 2023-03-13 VITALS
HEIGHT: 68 IN | DIASTOLIC BLOOD PRESSURE: 100 MMHG | SYSTOLIC BLOOD PRESSURE: 155 MMHG | RESPIRATION RATE: 20 BRPM | HEART RATE: 99 BPM | OXYGEN SATURATION: 98 % | BODY MASS INDEX: 28.79 KG/M2 | WEIGHT: 190 LBS | TEMPERATURE: 99.7 F

## 2023-03-13 DIAGNOSIS — R11.0 NAUSEA: ICD-10-CM

## 2023-03-13 DIAGNOSIS — R10.9 ABDOMINAL PAIN: Primary | ICD-10-CM

## 2023-03-13 LAB
ALBUMIN SERPL BCP-MCNC: 4.3 G/DL (ref 3.5–5)
ALP SERPL-CCNC: 60 U/L (ref 34–104)
ALT SERPL W P-5'-P-CCNC: 17 U/L (ref 7–52)
ANION GAP SERPL CALCULATED.3IONS-SCNC: 8 MMOL/L (ref 4–13)
AST SERPL W P-5'-P-CCNC: 13 U/L (ref 13–39)
BASOPHILS # BLD AUTO: 0.05 THOUSANDS/ÂΜL (ref 0–0.1)
BASOPHILS NFR BLD AUTO: 0 % (ref 0–1)
BILIRUB SERPL-MCNC: 0.86 MG/DL (ref 0.2–1)
BUN SERPL-MCNC: 8 MG/DL (ref 5–25)
CALCIUM SERPL-MCNC: 9.4 MG/DL (ref 8.4–10.2)
CHLORIDE SERPL-SCNC: 105 MMOL/L (ref 96–108)
CO2 SERPL-SCNC: 24 MMOL/L (ref 21–32)
CREAT SERPL-MCNC: 0.65 MG/DL (ref 0.6–1.3)
EOSINOPHIL # BLD AUTO: 0.13 THOUSAND/ÂΜL (ref 0–0.61)
EOSINOPHIL NFR BLD AUTO: 1 % (ref 0–6)
ERYTHROCYTE [DISTWIDTH] IN BLOOD BY AUTOMATED COUNT: 11.5 % (ref 11.6–15.1)
GFR SERPL CREATININE-BSD FRML MDRD: 127 ML/MIN/1.73SQ M
GLUCOSE SERPL-MCNC: 86 MG/DL (ref 65–140)
HCG SERPL QL: NEGATIVE
HCT VFR BLD AUTO: 38.6 % (ref 34.8–46.1)
HGB BLD-MCNC: 13.1 G/DL (ref 11.5–15.4)
IMM GRANULOCYTES # BLD AUTO: 0.03 THOUSAND/UL (ref 0–0.2)
IMM GRANULOCYTES NFR BLD AUTO: 0 % (ref 0–2)
LIPASE SERPL-CCNC: 7 U/L (ref 11–82)
LYMPHOCYTES # BLD AUTO: 2.02 THOUSANDS/ÂΜL (ref 0.6–4.47)
LYMPHOCYTES NFR BLD AUTO: 18 % (ref 14–44)
MCH RBC QN AUTO: 29.6 PG (ref 26.8–34.3)
MCHC RBC AUTO-ENTMCNC: 33.9 G/DL (ref 31.4–37.4)
MCV RBC AUTO: 87 FL (ref 82–98)
MONOCYTES # BLD AUTO: 1.33 THOUSAND/ÂΜL (ref 0.17–1.22)
MONOCYTES NFR BLD AUTO: 12 % (ref 4–12)
NEUTROPHILS # BLD AUTO: 7.95 THOUSANDS/ÂΜL (ref 1.85–7.62)
NEUTS SEG NFR BLD AUTO: 69 % (ref 43–75)
NRBC BLD AUTO-RTO: 0 /100 WBCS
PLATELET # BLD AUTO: 326 THOUSANDS/UL (ref 149–390)
PMV BLD AUTO: 9 FL (ref 8.9–12.7)
POTASSIUM SERPL-SCNC: 3.4 MMOL/L (ref 3.5–5.3)
PROT SERPL-MCNC: 7.3 G/DL (ref 6.4–8.4)
RBC # BLD AUTO: 4.43 MILLION/UL (ref 3.81–5.12)
SODIUM SERPL-SCNC: 137 MMOL/L (ref 135–147)
WBC # BLD AUTO: 11.51 THOUSAND/UL (ref 4.31–10.16)

## 2023-03-13 RX ORDER — MAGNESIUM HYDROXIDE/ALUMINUM HYDROXICE/SIMETHICONE 120; 1200; 1200 MG/30ML; MG/30ML; MG/30ML
30 SUSPENSION ORAL ONCE
Status: COMPLETED | OUTPATIENT
Start: 2023-03-13 | End: 2023-03-13

## 2023-03-13 RX ORDER — FAMOTIDINE 10 MG/ML
20 INJECTION, SOLUTION INTRAVENOUS ONCE
Status: COMPLETED | OUTPATIENT
Start: 2023-03-13 | End: 2023-03-13

## 2023-03-13 RX ORDER — SUCRALFATE 1 G/1
1 TABLET ORAL 4 TIMES DAILY
Qty: 60 TABLET | Refills: 0 | Status: SHIPPED | OUTPATIENT
Start: 2023-03-13

## 2023-03-13 RX ORDER — FAMOTIDINE 20 MG/1
20 TABLET, FILM COATED ORAL 2 TIMES DAILY
Qty: 30 TABLET | Refills: 0 | Status: SHIPPED | OUTPATIENT
Start: 2023-03-13 | End: 2023-03-21 | Stop reason: SDUPTHER

## 2023-03-13 RX ORDER — ONDANSETRON 2 MG/ML
4 INJECTION INTRAMUSCULAR; INTRAVENOUS ONCE
Status: COMPLETED | OUTPATIENT
Start: 2023-03-13 | End: 2023-03-13

## 2023-03-13 RX ORDER — KETOROLAC TROMETHAMINE 30 MG/ML
15 INJECTION, SOLUTION INTRAMUSCULAR; INTRAVENOUS ONCE
Status: COMPLETED | OUTPATIENT
Start: 2023-03-13 | End: 2023-03-13

## 2023-03-13 RX ORDER — ONDANSETRON 4 MG/1
4 TABLET, ORALLY DISINTEGRATING ORAL EVERY 6 HOURS PRN
Qty: 12 TABLET | Refills: 0 | Status: SHIPPED | OUTPATIENT
Start: 2023-03-13 | End: 2023-03-21 | Stop reason: ALTCHOICE

## 2023-03-13 RX ADMIN — FAMOTIDINE 20 MG: 10 INJECTION, SOLUTION INTRAVENOUS at 22:57

## 2023-03-13 RX ADMIN — ONDANSETRON 4 MG: 2 INJECTION INTRAMUSCULAR; INTRAVENOUS at 21:14

## 2023-03-13 RX ADMIN — KETOROLAC TROMETHAMINE 15 MG: 30 INJECTION, SOLUTION INTRAMUSCULAR at 21:14

## 2023-03-13 RX ADMIN — ALUMINUM HYDROXIDE, MAGNESIUM HYDROXIDE, AND SIMETHICONE 30 ML: 200; 200; 20 SUSPENSION ORAL at 22:57

## 2023-03-13 NOTE — ED PROVIDER NOTES
Pt Name: Denise Bowers  MRN: [id number]  [place] 2000  Age/Sex: 26 y o  female  Date of evaluation: 3/13/2023  PCP: Conrad Bearden [address] [place] [address]     Chief Complaint   Patient presents with   • Abdominal Pain     RUQ pain radiating to R flank, seen for same, rx abx without relief  Nausea w/o vomiting, denies diarrhea or fevers  HPI    26 y o  female presenting with abdominal pain  Patient states that she recently had her 23st birthday, celebrated in [place], had a 6-hour flight each way, and drank a fair amount of alcohol on one of the days  Afterwards, she complained of some abdominal pain, was seen at another emergency department on the ninth of this month, underwent significant testing, was told she may have a UTI and was prescribed Keflex  She states she was feeling better after that visit but has had a diminished appetite, only picking at food since returning from her trip  Today, she ate a [employer] bowl and had sudden worsening of her pain shortly afterwards  The pain is dull, severe, in the right upper quadrant and epigastric region, radiating towards the back, worse with eating or movement and better at rest   Patient also complains of nausea, denies diarrhea, fevers, trauma, chest pain, shortness of breath, other symptoms  HPI      Past Medical and Surgical History    Past Medical History:   Diagnosis Date   • Allergic    • Back pain    • Neck pain        History reviewed  No pertinent surgical history  History reviewed  No pertinent family history  Social History     Tobacco Use   • Smoking status: Never   • Smokeless tobacco: Never   Vaping Use   • Vaping Use: Never used   Substance Use Topics   • Alcohol use: Yes     Comment: socially   • Drug use: Never           Allergies    No Known Allergies    Home Medications    Prior to Admission medications    Medication Sig Start Date End Date Taking?  Authorizing Provider   cephalexin (KEFLEX) 500 mg capsule Take 1 capsule (500 mg total) by mouth every 6 (six) hours for 7 days 3/12/23 3/19/23  Jessica Simmons PA-C   Clinpro 5000 1 1 % PSTE APPLY 1 SQUIRT TO TEETH DAILY AND BRUSH AT BEDTIME  DO NOT RINSE  NOTHING TO EAT OR DRINK AFTERWARDS 6/20/22   Yee Vega, DMD   drospirenone-ethinyl estradiol (HOLGER) 3-0 02 MG per tablet  11/19/20   Historical Provider, MD   ondansetron (ZOFRAN-ODT) 4 mg disintegrating tablet Take 1 tablet (4 mg total) by mouth every 8 (eight) hours as needed for nausea or vomiting for up to 20 doses 3/9/23   Davonte Darling MD           Review of Systems    Review of Systems   Constitutional: Negative for activity change, chills and fever  HENT: Negative for drooling and facial swelling  Eyes: Negative for pain, discharge and visual disturbance  Respiratory: Negative for apnea, cough, chest tightness, shortness of breath and wheezing  Cardiovascular: Negative for chest pain and leg swelling  Gastrointestinal: Positive for abdominal pain and nausea  Negative for constipation, diarrhea and vomiting  Genitourinary: Negative for difficulty urinating, dysuria and urgency  Musculoskeletal: Negative for arthralgias, back pain and gait problem  Skin: Negative for color change and rash  Neurological: Negative for dizziness, speech difficulty, weakness and headaches  Psychiatric/Behavioral: Negative for agitation, behavioral problems and confusion  All other systems reviewed and negative  Physical Exam      ED Triage Vitals [03/13/23 1900]   Temperature Pulse Respirations Blood Pressure SpO2   99 7 °F (37 6 °C) 99 20 155/100 98 %      Temp Source Heart Rate Source Patient Position - Orthostatic VS BP Location FiO2 (%)   Temporal Monitor Sitting Right arm --      Pain Score       --               Physical Exam  Vitals and nursing note reviewed  Constitutional:       General: She is not in acute distress  Appearance: She is well-developed   She is not ill-appearing, toxic-appearing or diaphoretic  HENT:      Head: Normocephalic and atraumatic  Right Ear: External ear normal       Left Ear: External ear normal    Eyes:      Conjunctiva/sclera: Conjunctivae normal       Pupils: Pupils are equal, round, and reactive to light  Cardiovascular:      Rate and Rhythm: Normal rate and regular rhythm  Pulses: Normal pulses  Heart sounds: Normal heart sounds  No murmur heard  No friction rub  No gallop  Pulmonary:      Effort: Pulmonary effort is normal  No respiratory distress  Breath sounds: Normal breath sounds  No wheezing or rales  Abdominal:      General: There is no distension  Palpations: Abdomen is soft  Tenderness: There is abdominal tenderness  There is no right CVA tenderness, left CVA tenderness, guarding or rebound  Comments: Tender to palpation in the right upper quadrant and epigastric region, no rebound or guarding, equivocal Franks sign  No pain at McBurney's point, no CVA tenderness  Musculoskeletal:         General: No deformity  Normal range of motion  Cervical back: Normal range of motion and neck supple  Right lower leg: No edema  Left lower leg: No edema  Skin:     General: Skin is warm and dry  Capillary Refill: Capillary refill takes less than 2 seconds  Findings: No erythema or rash  Neurological:      Mental Status: She is alert and oriented to person, place, and time  Psychiatric:         Behavior: Behavior normal          Thought Content:  Thought content normal          Judgment: Judgment normal               Diagnostic Results      Labs:    Results Reviewed     Procedure Component Value Units Date/Time    hCG, qualitative pregnancy [[id number]]  (Normal) Collected: 03/13/23 2108    Lab Status: Final result Specimen: Blood from Arm, Right Updated: 03/13/23 2142     Preg, Serum Negative    Comprehensive metabolic panel [[id number]]  (Abnormal) Collected: 03/13/23 2108    Lab Status: Final result Specimen: Blood from Arm, Right Updated: 03/13/23 2136     Sodium 137 mmol/L      Potassium 3 4 mmol/L      Chloride 105 mmol/L      CO2 24 mmol/L      ANION GAP 8 mmol/L      BUN 8 mg/dL      Creatinine 0 65 mg/dL      Glucose 86 mg/dL      Calcium 9 4 mg/dL      AST 13 U/L      ALT 17 U/L      Alkaline Phosphatase 60 U/L      Total Protein 7 3 g/dL      Albumin 4 3 g/dL      Total Bilirubin 0 86 mg/dL      eGFR 127 ml/min/1 73sq m     Narrative:      National Kidney Disease Foundation guidelines for Chronic Kidney Disease (CKD):   •  Stage 1 with normal or high GFR (GFR > 90 mL/min/1 73 square meters)  •  Stage 2 Mild CKD (GFR = 60-89 mL/min/1 73 square meters)  •  Stage 3A Moderate CKD (GFR = 45-59 mL/min/1 73 square meters)  •  Stage 3B Moderate CKD (GFR = 30-44 mL/min/1 73 square meters)  •  Stage 4 Severe CKD (GFR = 15-29 mL/min/1 73 square meters)  •  Stage 5 End Stage CKD (GFR <15 mL/min/1 73 square meters)  Note: GFR calculation is accurate only with a steady state creatinine    Lipase [[id number]]  (Abnormal) Collected: 03/13/23 2108    Lab Status: Final result Specimen: Blood from Arm, Right Updated: 03/13/23 2136     Lipase 7 u/L     CBC and differential [[id number]]  (Abnormal) Collected: 03/13/23 2108    Lab Status: Final result Specimen: Blood from Arm, Right Updated: 03/13/23 2116     WBC 11 51 Thousand/uL      RBC 4 43 Million/uL      Hemoglobin 13 1 g/dL      Hematocrit 38 6 %      MCV 87 fL      MCH 29 6 pg      MCHC 33 9 g/dL      RDW 11 5 %      MPV 9 0 fL      Platelets 399 Thousands/uL      nRBC 0 /100 WBCs      Neutrophils Relative 69 %      Immat GRANS % 0 %      Lymphocytes Relative 18 %      Monocytes Relative 12 %      Eosinophils Relative 1 %      Basophils Relative 0 %      Neutrophils Absolute 7 95 Thousands/µL      Immature Grans Absolute 0 03 Thousand/uL      Lymphocytes Absolute 2 02 Thousands/µL      Monocytes Absolute 1 33 Thousand/µL      Eosinophils Absolute 0 13 Thousand/µL      Basophils Absolute 0 05 Thousands/µL           All labs reviewed and utilized in the medical decision making process    Radiology:    US right upper quadrant   Final Result      No cholelithiasis   No biliary dilation   No tenderness on sonographic palpation      Workstation performed: [id number]             All radiology studies independently viewed by me and interpreted by the radiologist     Procedure    Procedures        ED Course of Care and Re-Assessments      Symptoms resolved with Toradol Zofran famotidine and Maalox  We discussed repeat imaging with a CT scan but after discussion of risk and benefits and in the setting of reassuring labs and recent CT scan that was negative, patient declined which seems reasonable based on anticipated low diagnostic yield and radiation concerns  Medications   ketorolac (TORADOL) injection 15 mg (15 mg Intravenous Given 3/13/23 2114)   ondansetron (ZOFRAN) injection 4 mg (4 mg Intravenous Given 3/13/23 2114)   Famotidine (PF) (PEPCID) injection 20 mg (20 mg Intravenous Given 3/13/23 2257)   aluminum-magnesium hydroxide-simethicone (MYLANTA) oral suspension 30 mL (30 mL Oral Given 3/13/23 2257)           FINAL IMPRESSION    Final diagnoses:   Abdominal pain   Nausea         DISPOSITION/PLAN    Presentation as above with abdominal pain and nausea  Vital signs reassuring, examination likewise reassuring, remark for tenderness to palpation but overall benign abdominal exam   Reviewed electronic medical record and prior ER note and work-up to include negative D-dimer and reassuring CT scan  Labs reassuring, ultrasound performed and returned reassuring, low suspicion for cholecystitis or intractable biliary colic   low suspicion for appendicitis, small bowel obstruction, mesenteric ischemia, perforated ulcer, or other acute life-threat     Some concern for possible gastritis or ulcer based on recent history and worsening with fatty meal, biliary dysmotility also considered  Discussed differential diagnosis with patient, started on an acids, referred to gastroenterology for further care, discharged with strict return precautions, follow-up primary care doctor and gastroenterology  Time reflects when diagnosis was documented in both MDM as applicable and the Disposition within this note     Time User Action Codes Description Comment    3/13/2023 10:43 PM Duyen Spina Add [R10 9] Abdominal pain     3/13/2023 10:43 PM Duyen Spina Add [R11 0] Nausea       ED Disposition     ED Disposition   Discharge    Condition   Stable    Date/Time   Mon Mar 13, 2023 10:42 PM    Comment   Polina Martinez discharge to home/self care                 Follow-up Information     Follow up With Specialties Details Why Contact Info Additional [address] [facility] Emergency Department Emergency Medicine Go to  If symptoms worsen [address] [place] [id number] [facility] Emergency Department, [address], [place], [address] GALE Salazar PA-C Family Medicine, Physician Assistant Call in 1 day To schedule close outpatient followup for this visit [address] [place] [id number]  [phone]       [facility] Gastroenterology Call in 1 day To schedule close outpatient followup for this visit [address] [facility] [phone] [facility], [address] [facility], [address], [place], [phone]             PATIENT REFERRED TO:    [address] [place] [address] Emergency Department  [address] [facility] [address] [phone]  Go to   If symptoms worsen    Marilia Peck PA-C  [address]  [phone]    Call in 1 day  To schedule close outpatient followup for this visit    [facility] [place]  [address] [facility]  [phone]  Call in 1 day  To schedule close outpatient followup for this visit      DISCHARGE MEDICATIONS:    Discharge Medication List as of 3/13/2023 10:45 PM      START taking these medications    Details   famotidine (PEPCID) 20 mg tablet Take 1 tablet (20 mg total) by mouth 2 (two) times a day, Starting Mon 3/13/2023, Print      !! ondansetron (ZOFRAN-ODT) 4 mg disintegrating tablet Take 1 tablet (4 mg total) by mouth every 6 (six) hours as needed for nausea or vomiting, Starting Mon 3/13/2023, Print      sucralfate (CARAFATE) 1 g tablet Take 1 tablet (1 g total) by mouth 4 (four) times a day, Starting Mon 3/13/2023, Print       !! - Potential duplicate medications found  Please discuss with provider  CONTINUE these medications which have NOT CHANGED    Details   cephalexin (KEFLEX) 500 mg capsule Take 1 capsule (500 mg total) by mouth every 6 (six) hours for 7 days, Starting Sun 3/12/2023, Until Sun 3/19/2023, Normal      Clinpro 5000 1 1 % PSTE APPLY 1 SQUIRT TO TEETH DAILY AND BRUSH AT BEDTIME  DO NOT RINSE  NOTHING TO EAT OR DRINK AFTERWARDS, Normal      drospirenone-ethinyl estradiol (HOLGER) 3-0 02 MG per tablet Starting u 11/19/2020, Historical Med      !! ondansetron (ZOFRAN-ODT) 4 mg disintegrating tablet Take 1 tablet (4 mg total) by mouth every 8 (eight) hours as needed for nausea or vomiting for up to 20 doses, Starting Thu 3/9/2023, Normal       !! - Potential duplicate medications found  Please discuss with provider  Marleni Montoya MD    Portions of the record may have been created with voice recognition software  Occasional wrong word or "sound alike" substitutions may have occurred due to the inherent limitations of voice recognition software    Please read the chart carefully and recognize, using context, where substitutions have occurred     Marce Martinez MD  03/14/23 0000

## 2023-03-13 NOTE — Clinical Note
Sina Zelaya was seen and treated in our emergency department on 3/13/2023  Diagnosis: Abdominal pain    Elverna Schaumann  may return to school on return date  She may return on this date: 03/15/2023         If you have any questions or concerns, please don't hesitate to call        Anthony Elizabeth MD    ______________________________           _______________          _______________  Hospital Representative                              Date                                Time

## 2023-03-21 ENCOUNTER — OFFICE VISIT (OUTPATIENT)
Dept: GASTROENTEROLOGY | Facility: CLINIC | Age: 23
End: 2023-03-21

## 2023-03-21 VITALS
WEIGHT: 202.4 LBS | HEIGHT: 68 IN | DIASTOLIC BLOOD PRESSURE: 90 MMHG | SYSTOLIC BLOOD PRESSURE: 128 MMHG | HEART RATE: 100 BPM | BODY MASS INDEX: 30.68 KG/M2 | OXYGEN SATURATION: 97 %

## 2023-03-21 DIAGNOSIS — R11.0 NAUSEA: ICD-10-CM

## 2023-03-21 DIAGNOSIS — R10.9 ABDOMINAL PAIN: ICD-10-CM

## 2023-03-21 DIAGNOSIS — R10.11 RUQ PAIN: Primary | ICD-10-CM

## 2023-03-21 RX ORDER — IBUPROFEN 600 MG/1
600 TABLET ORAL EVERY 6 HOURS PRN
COMMUNITY
Start: 2023-02-14 | End: 2023-03-21 | Stop reason: ALTCHOICE

## 2023-03-21 RX ORDER — PANTOPRAZOLE SODIUM 40 MG/1
40 TABLET, DELAYED RELEASE ORAL DAILY
Qty: 30 TABLET | Refills: 2 | Status: SHIPPED | OUTPATIENT
Start: 2023-03-21 | End: 2023-04-20

## 2023-03-21 RX ORDER — FAMOTIDINE 20 MG/1
20 TABLET, FILM COATED ORAL
Qty: 30 TABLET | Refills: 1 | Status: SHIPPED | OUTPATIENT
Start: 2023-03-21 | End: 2023-03-21

## 2023-03-21 RX ORDER — FAMOTIDINE 20 MG/1
20 TABLET, FILM COATED ORAL
Qty: 30 TABLET | Refills: 1 | Status: SHIPPED | OUTPATIENT
Start: 2023-03-21 | End: 2023-04-20

## 2023-03-21 NOTE — H&P (VIEW-ONLY)
[facility] - Outpatient Consultation  Natalie Batista 26 y o  female MRN: [id number]  Encounter: [id number]          ASSESSMENT AND PLAN:      1  RUQ pain  2  Nausea    Patient presents for an evaluation of RUQ discomfort and nausea x several weeks  She had a CT Scan A/P which showed no acute abdominal process  She had a RUQ US which showed a normal gallbladder  She was started on Pepcid and Carafate without benefit  Will plan for EGD to investigate for PUD, erosive gastritis, bx for h pylori, etc   Will begin a Pantoprazole 40mg po daily in am course x 8 weeks and Pepcid 20mg at bedtime  Follow up after above testing  If EGD negative and patient does not improve on the PPI, will check a hepatobiliary scan   ______________________________________________________________________    HPI:  Patient is a pleasant 26year old female who presents to the office for a gastrointestinal evaluation  Patient reports that she has been having RUQ discomfort x several weeks  It started after visiting [facility]  She reports she did consume alcohol there but overall does not regularly consume alcohol  She reports associated nausea but no vomiting  She denies frequent NSAIDs  No problems with her bowel movements or blood in the stool  She is not a smoker  She was put on Famotidine and Carafate but has not noticed much benefit  REVIEW OF SYSTEMS:    CONSTITUTIONAL: Denies any fever, chills, rigors, and weight loss  HEENT: No earache or tinnitus  Denies hearing loss or visual disturbances  CARDIOVASCULAR: No chest pain or palpitations  RESPIRATORY: Denies any cough, hemoptysis, shortness of breath or dyspnea on exertion  GASTROINTESTINAL: As noted in the History of Present Illness  GENITOURINARY: No problems with urination  Denies any hematuria or dysuria  NEUROLOGIC: No dizziness or vertigo, denies headaches  MUSCULOSKELETAL: Denies any muscle or joint pain     SKIN: Denies skin "rashes or itching  ENDOCRINE: Denies excessive thirst  Denies intolerance to heat or cold  PSYCHOSOCIAL: Denies depression or anxiety  Denies any recent memory loss  Historical Information   Past Medical History:   Diagnosis Date   • Allergic    • Back pain    • Neck pain      No past surgical history on file  Social History   Social History     Substance and Sexual Activity   Alcohol Use Yes    Comment: socially     Social History     Substance and Sexual Activity   Drug Use Never     Social History     Tobacco Use   Smoking Status Never   Smokeless Tobacco Never     No family history on file  Meds/Allergies       Current Outpatient Medications:   •  drospirenone-ethinyl estradiol (HOLGER) 3-0 02 MG per tablet  •  famotidine (PEPCID) 20 mg tablet  •  pantoprazole (PROTONIX) 40 mg tablet  •  sucralfate (CARAFATE) 1 g tablet    No Known Allergies        Objective     Blood pressure 128/90, pulse 100, height 5' 8\" (1 727 m), weight 91 8 kg (202 lb 6 4 oz), last menstrual period 03/06/2023, SpO2 97 %  Body mass index is 30 77 kg/m²  PHYSICAL EXAM:      General Appearance:   Alert, cooperative, no distress   HEENT:   Normocephalic, atraumatic, anicteric      Neck:  Supple, symmetrical, trachea midline   Lungs:   Clear to auscultation bilaterally; no rales, rhonchi or wheezing; respirations unlabored    Heart[de-identified]   Regular rate and rhythm; no murmur, rub, or gallop  Abdomen:   Soft, non-tender, non-distended; normal bowel sounds; no masses, no organomegaly    Genitalia:   Deferred    Rectal:   Deferred    Extremities:  No cyanosis, clubbing or edema    Pulses:  2+ and symmetric    Skin:  No jaundice, rashes, or lesions    Lymph nodes:  No palpable cervical lymphadenopathy        Lab Results:   No visits with results within 1 Day(s) from this visit     Latest known visit with results is:   Admission on 03/13/2023, Discharged on 03/13/2023   Component Date Value   • WBC 03/13/2023 11 51 (H)    • RBC " 03/13/2023 4 43    • Hemoglobin 03/13/2023 13 1    • Hematocrit 03/13/2023 38 6    • MCV 03/13/2023 87    • MCH 03/13/2023 29 6    • MCHC 03/13/2023 33 9    • RDW 03/13/2023 11 5 (L)    • MPV 03/13/2023 9 0    • Platelets 10/12/8486 326    • nRBC 03/13/2023 0    • Neutrophils Relative 03/13/2023 69    • Immat GRANS % 03/13/2023 0    • Lymphocytes Relative 03/13/2023 18    • Monocytes Relative 03/13/2023 12    • Eosinophils Relative 03/13/2023 1    • Basophils Relative 03/13/2023 0    • Neutrophils Absolute 03/13/2023 7 95 (H)    • Immature Grans Absolute 03/13/2023 0 03    • Lymphocytes Absolute 03/13/2023 2 02    • Monocytes Absolute 03/13/2023 1 33 (H)    • Eosinophils Absolute 03/13/2023 0 13    • Basophils Absolute 03/13/2023 0 05    • Sodium 03/13/2023 137    • Potassium 03/13/2023 3 4 (L)    • Chloride 03/13/2023 105    • CO2 03/13/2023 24    • ANION GAP 03/13/2023 8    • BUN 03/13/2023 8    • Creatinine 03/13/2023 0 65    • Glucose 03/13/2023 86    • Calcium 03/13/2023 9 4    • AST 03/13/2023 13    • ALT 03/13/2023 17    • Alkaline Phosphatase 03/13/2023 60    • Total Protein 03/13/2023 7 3    • Albumin 03/13/2023 4 3    • Total Bilirubin 03/13/2023 0 86    • eGFR 03/13/2023 127    • Lipase 03/13/2023 7 (L)    • Preg, Serum 03/13/2023 Negative          Radiology Results:   US right upper quadrant    Result Date: 3/13/2023  Narrative: RIGHT UPPER QUADRANT ULTRASOUND INDICATION:     RUQ pain, equivocal pires's sign, concern for cholecystitis  AST of 13, alt 17 and alkaline phosphatase 58 Total bilirubin 0 86 COMPARISON:  None TECHNIQUE:   Real-time ultrasound of the right upper quadrant was performed with a curvilinear transducer with both volumetric sweeps and still imaging techniques  FINDINGS: PANCREAS:  Visualized portions of the pancreas are within normal limits  AORTA AND IVC:  Visualized portions are normal for patient age  LIVER: Size:  Within normal range    The liver measures 15 7 cm in the midclavicular line  Contour:  Surface contour is smooth  Parenchyma:  Echogenicity and echotexture are within normal limits  Mild increased periportal echogenicity seen, nonspecific finding  No liver mass identified  Limited imaging of the main portal vein shows it to be patent and hepatopetal   BILIARY: No gallbladder findings  No cholelithiasis Sonographic Franks's sign is negative  Gallbladder wall thickness is within normal limits No intrahepatic biliary dilatation  CBD measures 4 0 mm  No choledocholithiasis  KIDNEY: Right kidney measures 11 7 x 3 6 x 4 7 cm  Volume 103 5 mL Kidney within normal limits  ASCITES:   None  Impression: No cholelithiasis No biliary dilation No tenderness on sonographic palpation Workstation performed: [id number]     CT abdomen pelvis with contrast    Result Date: 3/9/2023  Narrative: CT ABDOMEN AND PELVIS WITH IV CONTRAST INDICATION:   RLQ abdominal pain (Age >= 14y) rt sided abdm pain RUQ greater than RLQ rt sided abdom pain  COMPARISON:  12/26/2020  TECHNIQUE:  CT examination of the abdomen and pelvis was performed  Axial, sagittal, and coronal 2D reformatted images were created from the source data and submitted for interpretation  Radiation dose length product (DLP) for this visit:  709 3 mGy-cm   This examination, like all CT scans performed in the [facility], was performed utilizing techniques to minimize radiation dose exposure, including the use of iterative reconstruction and automated exposure control  IV Contrast:  100 mL of iohexol (OMNIPAQUE) Enteric Contrast:  Enteric contrast was not administered  FINDINGS: ABDOMEN LOWER CHEST:  No clinically significant abnormality identified in the visualized lower chest  LIVER/BILIARY TREE:  Unremarkable  GALLBLADDER:  No calcified gallstones  No pericholecystic inflammatory change  SPLEEN:  Unremarkable  PANCREAS:  Unremarkable  ADRENAL GLANDS:  Unremarkable  KIDNEYS/URETERS:  Unremarkable  No hydronephrosis  STOMACH AND BOWEL:  Unremarkable  APPENDIX:  A normal appendix was visualized  ABDOMINOPELVIC CAVITY:  No ascites  No pneumoperitoneum  No lymphadenopathy  VESSELS:  Unremarkable for patient's age  PELVIS REPRODUCTIVE ORGANS:  Unremarkable for patient's age  URINARY BLADDER:  Unremarkable  ABDOMINAL WALL/INGUINAL REGIONS:  Unremarkable  OSSEOUS STRUCTURES:  No acute fracture or destructive osseous lesion  Impression: No evidence of acute intra-abdominal process   Workstation performed: [id number]

## 2023-03-21 NOTE — PROGRESS NOTES
[facility] - Outpatient Consultation  Holly Clinton 26 y o  female MRN: [id number]  Encounter: [id number]          ASSESSMENT AND PLAN:      1  RUQ pain  2  Nausea    Patient presents for an evaluation of RUQ discomfort and nausea x several weeks  She had a CT Scan A/P which showed no acute abdominal process  She had a RUQ US which showed a normal gallbladder  She was started on Pepcid and Carafate without benefit  Will plan for EGD to investigate for PUD, erosive gastritis, bx for h pylori, etc   Will begin a Pantoprazole 40mg po daily in am course x 8 weeks and Pepcid 20mg at bedtime  Follow up after above testing  If EGD negative and patient does not improve on the PPI, will check a hepatobiliary scan   ______________________________________________________________________    HPI:  Patient is a pleasant 26year old female who presents to the office for a gastrointestinal evaluation  Patient reports that she has been having RUQ discomfort x several weeks  It started after visiting [facility]  She reports she did consume alcohol there but overall does not regularly consume alcohol  She reports associated nausea but no vomiting  She denies frequent NSAIDs  No problems with her bowel movements or blood in the stool  She is not a smoker  She was put on Famotidine and Carafate but has not noticed much benefit  REVIEW OF SYSTEMS:    CONSTITUTIONAL: Denies any fever, chills, rigors, and weight loss  HEENT: No earache or tinnitus  Denies hearing loss or visual disturbances  CARDIOVASCULAR: No chest pain or palpitations  RESPIRATORY: Denies any cough, hemoptysis, shortness of breath or dyspnea on exertion  GASTROINTESTINAL: As noted in the History of Present Illness  GENITOURINARY: No problems with urination  Denies any hematuria or dysuria  NEUROLOGIC: No dizziness or vertigo, denies headaches  MUSCULOSKELETAL: Denies any muscle or joint pain     SKIN: Denies skin rashes or itching  ENDOCRINE: Denies excessive thirst  Denies intolerance to heat or cold  PSYCHOSOCIAL: Denies depression or anxiety  Denies any recent memory loss  Historical Information   Past Medical History:   Diagnosis Date   • Allergic    • Back pain    • Neck pain      No past surgical history on file  Social History   Social History     Substance and Sexual Activity   Alcohol Use Yes    Comment: socially     Social History     Substance and Sexual Activity   Drug Use Never     Social History     Tobacco Use   Smoking Status Never   Smokeless Tobacco Never     No family history on file  Meds/Allergies       Current Outpatient Medications:   •  drospirenone-ethinyl estradiol (HOLGER) 3-0 02 MG per tablet  •  famotidine (PEPCID) 20 mg tablet  •  pantoprazole (PROTONIX) 40 mg tablet  •  sucralfate (CARAFATE) 1 g tablet    No Known Allergies        Objective     Blood pressure 128/90, pulse 100, height 5' 8" (1 727 m), weight 91 8 kg (202 lb 6 4 oz), last menstrual period 03/06/2023, SpO2 97 %  Body mass index is 30 77 kg/m²  PHYSICAL EXAM:      General Appearance:   Alert, cooperative, no distress   HEENT:   Normocephalic, atraumatic, anicteric      Neck:  Supple, symmetrical, trachea midline   Lungs:   Clear to auscultation bilaterally; no rales, rhonchi or wheezing; respirations unlabored    Heart[de-identified]   Regular rate and rhythm; no murmur, rub, or gallop  Abdomen:   Soft, non-tender, non-distended; normal bowel sounds; no masses, no organomegaly    Genitalia:   Deferred    Rectal:   Deferred    Extremities:  No cyanosis, clubbing or edema    Pulses:  2+ and symmetric    Skin:  No jaundice, rashes, or lesions    Lymph nodes:  No palpable cervical lymphadenopathy        Lab Results:   No visits with results within 1 Day(s) from this visit     Latest known visit with results is:   Admission on 03/13/2023, Discharged on 03/13/2023   Component Date Value   • WBC 03/13/2023 11 51 (H)    • RBC 03/13/2023 4 43    • Hemoglobin 03/13/2023 13 1    • Hematocrit 03/13/2023 38 6    • MCV 03/13/2023 87    • MCH 03/13/2023 29 6    • MCHC 03/13/2023 33 9    • RDW 03/13/2023 11 5 (L)    • MPV 03/13/2023 9 0    • Platelets 93/40/8552 326    • nRBC 03/13/2023 0    • Neutrophils Relative 03/13/2023 69    • Immat GRANS % 03/13/2023 0    • Lymphocytes Relative 03/13/2023 18    • Monocytes Relative 03/13/2023 12    • Eosinophils Relative 03/13/2023 1    • Basophils Relative 03/13/2023 0    • Neutrophils Absolute 03/13/2023 7 95 (H)    • Immature Grans Absolute 03/13/2023 0 03    • Lymphocytes Absolute 03/13/2023 2 02    • Monocytes Absolute 03/13/2023 1 33 (H)    • Eosinophils Absolute 03/13/2023 0 13    • Basophils Absolute 03/13/2023 0 05    • Sodium 03/13/2023 137    • Potassium 03/13/2023 3 4 (L)    • Chloride 03/13/2023 105    • CO2 03/13/2023 24    • ANION GAP 03/13/2023 8    • BUN 03/13/2023 8    • Creatinine 03/13/2023 0 65    • Glucose 03/13/2023 86    • Calcium 03/13/2023 9 4    • AST 03/13/2023 13    • ALT 03/13/2023 17    • Alkaline Phosphatase 03/13/2023 60    • Total Protein 03/13/2023 7 3    • Albumin 03/13/2023 4 3    • Total Bilirubin 03/13/2023 0 86    • eGFR 03/13/2023 127    • Lipase 03/13/2023 7 (L)    • Preg, Serum 03/13/2023 Negative          Radiology Results:   US right upper quadrant    Result Date: 3/13/2023  Narrative: RIGHT UPPER QUADRANT ULTRASOUND INDICATION:     RUQ pain, equivocal pires's sign, concern for cholecystitis  AST of 13, alt 17 and alkaline phosphatase 58 Total bilirubin 0 86 COMPARISON:  None TECHNIQUE:   Real-time ultrasound of the right upper quadrant was performed with a curvilinear transducer with both volumetric sweeps and still imaging techniques  FINDINGS: PANCREAS:  Visualized portions of the pancreas are within normal limits  AORTA AND IVC:  Visualized portions are normal for patient age  LIVER: Size:  Within normal range    The liver measures 15 7 cm in the midclavicular line  Contour:  Surface contour is smooth  Parenchyma:  Echogenicity and echotexture are within normal limits  Mild increased periportal echogenicity seen, nonspecific finding  No liver mass identified  Limited imaging of the main portal vein shows it to be patent and hepatopetal   BILIARY: No gallbladder findings  No cholelithiasis Sonographic Franks's sign is negative  Gallbladder wall thickness is within normal limits No intrahepatic biliary dilatation  CBD measures 4 0 mm  No choledocholithiasis  KIDNEY: Right kidney measures 11 7 x 3 6 x 4 7 cm  Volume 103 5 mL Kidney within normal limits  ASCITES:   None  Impression: No cholelithiasis No biliary dilation No tenderness on sonographic palpation Workstation performed: [id number]     CT abdomen pelvis with contrast    Result Date: 3/9/2023  Narrative: CT ABDOMEN AND PELVIS WITH IV CONTRAST INDICATION:   RLQ abdominal pain (Age >= 14y) rt sided abdm pain RUQ greater than RLQ rt sided abdom pain  COMPARISON:  12/26/2020  TECHNIQUE:  CT examination of the abdomen and pelvis was performed  Axial, sagittal, and coronal 2D reformatted images were created from the source data and submitted for interpretation  Radiation dose length product (DLP) for this visit:  709 3 mGy-cm   This examination, like all CT scans performed in the [place], was performed utilizing techniques to minimize radiation dose exposure, including the use of iterative reconstruction and automated exposure control  IV Contrast:  100 mL of iohexol (OMNIPAQUE) Enteric Contrast:  Enteric contrast was not administered  FINDINGS: ABDOMEN LOWER CHEST:  No clinically significant abnormality identified in the visualized lower chest  LIVER/BILIARY TREE:  Unremarkable  GALLBLADDER:  No calcified gallstones  No pericholecystic inflammatory change  SPLEEN:  Unremarkable  PANCREAS:  Unremarkable  ADRENAL GLANDS:  Unremarkable  KIDNEYS/URETERS:  Unremarkable  No hydronephrosis  STOMACH AND BOWEL:  Unremarkable  APPENDIX:  A normal appendix was visualized  ABDOMINOPELVIC CAVITY:  No ascites  No pneumoperitoneum  No lymphadenopathy  VESSELS:  Unremarkable for patient's age  PELVIS REPRODUCTIVE ORGANS:  Unremarkable for patient's age  URINARY BLADDER:  Unremarkable  ABDOMINAL WALL/INGUINAL REGIONS:  Unremarkable  OSSEOUS STRUCTURES:  No acute fracture or destructive osseous lesion  Impression: No evidence of acute intra-abdominal process   Workstation performed: [id number]

## 2023-03-21 NOTE — PATIENT INSTRUCTIONS
Scheduled date of EGD(as of today): 4/4/23  Physician performing EGD: Katelin Black  Location of EGD: [place]  Instructions reviewed with patient by: Yee PATTERSON  Clearances:

## 2023-03-28 ENCOUNTER — PATIENT MESSAGE (OUTPATIENT)
Dept: GASTROENTEROLOGY | Facility: CLINIC | Age: 23
End: 2023-03-28

## 2023-03-28 ENCOUNTER — TELEPHONE (OUTPATIENT)
Dept: GASTROENTEROLOGY | Facility: CLINIC | Age: 23
End: 2023-03-28

## 2023-03-28 NOTE — TELEPHONE ENCOUNTER
Patients GI provider:  Helio Bui    Number to return call: [phone]    Reason for call: Pt called in stating that she had a note for school when she was seen in the office but the school is not accepting how the note was written  Pt requesting to please resend the note via her MyChart  Pt stated that she needs the note in order to continue her classes via zoom due to her GI issues  The notes needs to state that her issues are disabling her from every day tasks and medically necessary  Pt is requesting to have this done ASAP as they are holding her in class assignments until she provides this updated note  Above is her number       Scheduled procedure/appointment date if applicable: procedure 6/6/31

## 2023-03-28 NOTE — LETTER
March 28, 2023     Kyra Jimenez    Patient: Kyra Jimenez   YOB: 2000   Date of Visit: 03/21/2023       To Whom This May Concern:     Kyra Jimenez is currently being treated under my professional care for her current gastrointestinal symptoms  Due to significant gastrointestinal symptoms at this time, please allow Ms Felicia Cooley to attend classes virtually as needed while she undergoes further medical evaluation and treatment  Unfortunately due to the severity of her symptoms it is medically necessary that she is to resume her classes via ZOOM as they are disabling her from everyday task  If you have any questions or concerns please don't hesitate to give our office a call at [phone]       Sincerely,          Jose Alfonso MD

## 2023-04-04 ENCOUNTER — ANESTHESIA EVENT (OUTPATIENT)
Dept: GASTROENTEROLOGY | Facility: HOSPITAL | Age: 23
End: 2023-04-04

## 2023-04-04 ENCOUNTER — ANESTHESIA (OUTPATIENT)
Dept: GASTROENTEROLOGY | Facility: HOSPITAL | Age: 23
End: 2023-04-04

## 2023-04-04 ENCOUNTER — HOSPITAL ENCOUNTER (OUTPATIENT)
Dept: GASTROENTEROLOGY | Facility: HOSPITAL | Age: 23
Setting detail: OUTPATIENT SURGERY
Discharge: HOME | End: 2023-04-04

## 2023-04-04 VITALS
HEART RATE: 75 BPM | BODY MASS INDEX: 31.04 KG/M2 | RESPIRATION RATE: 18 BRPM | OXYGEN SATURATION: 99 % | TEMPERATURE: 97.5 F | DIASTOLIC BLOOD PRESSURE: 75 MMHG | SYSTOLIC BLOOD PRESSURE: 117 MMHG | WEIGHT: 204.81 LBS | HEIGHT: 68 IN

## 2023-04-04 DIAGNOSIS — R10.11 RUQ PAIN: ICD-10-CM

## 2023-04-04 DIAGNOSIS — R11.0 NAUSEA: ICD-10-CM

## 2023-04-04 LAB
EXT PREGNANCY TEST URINE: NEGATIVE
EXT. CONTROL: NORMAL

## 2023-04-04 RX ORDER — SODIUM CHLORIDE, SODIUM LACTATE, POTASSIUM CHLORIDE, CALCIUM CHLORIDE 600; 310; 30; 20 MG/100ML; MG/100ML; MG/100ML; MG/100ML
75 INJECTION, SOLUTION INTRAVENOUS CONTINUOUS
Status: DISCONTINUED | OUTPATIENT
Start: 2023-04-04 | End: 2023-04-08 | Stop reason: HOSPADM

## 2023-04-04 RX ORDER — LIDOCAINE HYDROCHLORIDE 20 MG/ML
INJECTION, SOLUTION EPIDURAL; INFILTRATION; INTRACAUDAL; PERINEURAL AS NEEDED
Status: DISCONTINUED | OUTPATIENT
Start: 2023-04-04 | End: 2023-04-04

## 2023-04-04 RX ORDER — PROPOFOL 10 MG/ML
INJECTION, EMULSION INTRAVENOUS AS NEEDED
Status: DISCONTINUED | OUTPATIENT
Start: 2023-04-04 | End: 2023-04-04

## 2023-04-04 RX ADMIN — PROPOFOL 50 MG: 10 INJECTION, EMULSION INTRAVENOUS at 08:28

## 2023-04-04 RX ADMIN — LIDOCAINE HYDROCHLORIDE 5 ML: 20 INJECTION, SOLUTION EPIDURAL; INFILTRATION; INTRACAUDAL; PERINEURAL at 08:24

## 2023-04-04 RX ADMIN — SODIUM CHLORIDE, SODIUM LACTATE, POTASSIUM CHLORIDE, AND CALCIUM CHLORIDE 75 ML/HR: .6; .31; .03; .02 INJECTION, SOLUTION INTRAVENOUS at 07:31

## 2023-04-04 RX ADMIN — PROPOFOL 50 MG: 10 INJECTION, EMULSION INTRAVENOUS at 08:30

## 2023-04-04 RX ADMIN — PROPOFOL 150 MG: 10 INJECTION, EMULSION INTRAVENOUS at 08:26

## 2023-04-04 NOTE — ANESTHESIA PREPROCEDURE EVALUATION
Procedure:  EGD    Relevant Problems   No relevant active problems        Physical Exam    Airway    Mallampati score: II  TM Distance: >3 FB  Neck ROM: full     Dental   No notable dental hx     Cardiovascular  Cardiovascular exam normal    Pulmonary  Pulmonary exam normal     Other Findings        Anesthesia Plan  ASA Score- 1     Anesthesia Type- IV sedation with anesthesia with ASA Monitors  Additional Monitors:   Airway Plan:     Comment: Discussed health hazards of vaping, not ready to quit  Plan Factors-Exercise tolerance (METS): >4 METS  Chart reviewed  EKG reviewed  Imaging results reviewed  Existing labs reviewed  Patient summary reviewed  Patient is a current smoker  Patient instructed to abstain from smoking on day of procedure  Patient did not smoke on day of surgery  Induction- intravenous  Postoperative Plan-     Informed Consent- Anesthetic plan and risks discussed with patient  I personally reviewed this patient with the CRNA  Discussed and agreed on the Anesthesia Plan with the CRNA  Noe Boswell

## 2023-04-04 NOTE — INTERVAL H&P NOTE
H&P reviewed  After examining the patient I find no changes in the patients condition since the H&P had been written      Vitals:    04/04/23 0702   BP: 132/75   Pulse: 76   Resp: 20   Temp: 99 °F (37 2 °C)   SpO2: 99%

## 2023-04-04 NOTE — ANESTHESIA POSTPROCEDURE EVALUATION
Post-Op Assessment Note    CV Status:  Stable    Pain management: adequate     Mental Status:  Sleepy   Hydration Status:  Euvolemic   PONV Controlled:  Controlled   Airway Patency:  Patent      Post Op Vitals Reviewed: Yes      Staff: CRNA         No notable events documented      /67 (04/04/23 0834)    Temp 97 5 °F (36 4 °C) (04/04/23 0834)    Pulse 84 (04/04/23 0834)   Resp 18 (04/04/23 0834)    SpO2 96 % (04/04/23 0834)

## 2023-04-25 ENCOUNTER — TELEPHONE (OUTPATIENT)
Dept: GASTROENTEROLOGY | Facility: CLINIC | Age: 23
End: 2023-04-25

## 2023-04-25 DIAGNOSIS — R10.11 RIGHT UPPER QUADRANT ABDOMINAL PAIN: Primary | ICD-10-CM

## 2023-04-25 NOTE — TELEPHONE ENCOUNTER
Patients GI provider:  SHARLENE Santos    Number to return call: [phone]     Reason for call: Pt called in regards to her EGD results  Would like to go over these results and is asking for a call back      Scheduled procedure/appointment date if applicable: N/A

## 2023-04-25 NOTE — TELEPHONE ENCOUNTER
I cannot get through to the patient  Please try her number and tell her that the biopsies were completely normal and as per Margarita's note we are going to order a HIDA scan to check her gallbladder function    Please order the HIDA scan for abdominal pain, nausea and vomiting

## 2023-05-05 ENCOUNTER — HOSPITAL ENCOUNTER (OUTPATIENT)
Dept: NUCLEAR MEDICINE | Facility: HOSPITAL | Age: 23
Discharge: HOME | End: 2023-05-05

## 2023-05-05 DIAGNOSIS — R10.11 RIGHT UPPER QUADRANT ABDOMINAL PAIN: ICD-10-CM

## 2023-05-05 RX ADMIN — SINCALIDE 1.8 MCG: 5 INJECTION, POWDER, LYOPHILIZED, FOR SOLUTION INTRAVENOUS at 10:55

## 2023-05-05 NOTE — TELEPHONE ENCOUNTER
Pt called in stating she missed a call from Dr Georgia Funk  She's requesting a call back at [phone]

## 2023-05-05 NOTE — TELEPHONE ENCOUNTER
Spoke to pt she is happy with results and stated that she did the HIDA scan today and is waiting for results

## 2023-05-08 ENCOUNTER — TELEPHONE (OUTPATIENT)
Dept: SURGERY | Facility: CLINIC | Age: 23
End: 2023-05-08

## 2023-05-08 DIAGNOSIS — K82.8 BILIARY DYSKINESIA: Primary | ICD-10-CM

## 2023-05-08 NOTE — TELEPHONE ENCOUNTER
Patient calling to make an appointment for her Gallbladder  She completed an U/S, EGD, Hida Scan  Please call her to make an appointment asap   [phone] [facility] [employer])

## 2023-05-12 ENCOUNTER — OFFICE VISIT (OUTPATIENT)
Dept: SURGERY | Facility: CLINIC | Age: 23
End: 2023-05-12

## 2023-05-12 VITALS
HEIGHT: 68 IN | BODY MASS INDEX: 31.4 KG/M2 | SYSTOLIC BLOOD PRESSURE: 115 MMHG | RESPIRATION RATE: 18 BRPM | DIASTOLIC BLOOD PRESSURE: 70 MMHG | OXYGEN SATURATION: 98 % | HEART RATE: 103 BPM | WEIGHT: 207.2 LBS | TEMPERATURE: 98.2 F

## 2023-05-12 DIAGNOSIS — K82.8 BILIARY DYSKINESIA: ICD-10-CM

## 2023-05-12 NOTE — PROGRESS NOTES
RUQ U/S:   LIVER:  Size:  Within normal range  The liver measures 15 7 cm in the midclavicular line  Contour:  Surface contour is smooth  Parenchyma:  Echogenicity and echotexture are within normal limits  Mild increased periportal echogenicity seen, nonspecific finding  No liver mass identified  Limited imaging of the main portal vein shows it to be patent and hepatopetal      BILIARY:  No gallbladder findings  No cholelithiasis  Sonographic Franks's sign is negative  Gallbladder wall thickness is within normal limits  No intrahepatic biliary dilatation  CBD measures 4 0 mm  No choledocholithiasis      KIDNEY:   Right kidney measures 11 7 x 3 6 x 4 7 cm  Volume 103 5 mL  Kidney within normal limits      ASCITES:   None      IMPRESSION:     No cholelithiasis  No biliary dilation  No tenderness on sonographic palpation    NM Hepatobiliary:  FINDINGS:     There is prompt, uniform accumulation with normal clearance of the radiopharmaceutical by the liver  There is normal filling of the intrahepatic ducts, common bile duct and gallbladder with normal excretion of the radiopharmaceutical into the duodenum      In order to evaluate the contractile response of the gallbladder to  cholecystokinin stimulation, 1 8 mcg sincalide (0 02 mcg/kg) was mixed with saline for a total of 60 cc and administered by slow intravenous infusion up to 60 minutes  These images   demonstrate abnormal contraction of the gallbladder  The calculated gallbladder ejection fraction is 9% (N = >38%)      The patient experienced nausea/sweating symptoms after CCK administration      IMPRESSION:     Severely reduced gallbladder ejection fraction of 9% at 60 minutes  Findings are consistent with chronic cholecystitis/biliary dyskinesia in the appropriate clinical setting  Assessment/Plan:     1   Biliary dyskinesia  -     Ambulatory Referral to General Surgery  -     Case request operating room: CHOLECYSTECTOMY LAPAROSCOPIC; Standing  - Comprehensive metabolic panel; Future  -     CBC and differential; Future      The risks and benefits of cholecystectomy were discussed and the plan for laparoscopic cholecystectomy was outlined with the use a picture for visual aid  We discussed the risks not limited to bleeding, infection, bile duct injury and reactions to anesthetic medications  We discussed the anticipated approach and incisions and the anticipated postoperative course  Patient's questions were answered and a consent form was signed  Subjective:      Patient ID: Ari Mcnair is a 26 y o  female  Triage Notes:    Margaret Martinez is presenting with RUQ pain and biliary dyskinesia  And into her shoulder blade  Eating  Only prior procedure wisdom teeth extraction  The following portions of the patient's history were reviewed and updated as appropriate:   She  has a past medical history of Allergic, Back pain, and Neck pain  She There are no problems to display for this patient  She  has a past surgical history that includes [place] tooth extraction  Her family history includes No Known Problems in her brother and mother  She  reports that she has never smoked  She has never used smokeless tobacco  She reports current alcohol use  She reports that she does not use drugs  Current Outpatient Medications on File Prior to Visit   Medication Sig   • drospirenone-ethinyl estradiol (HOLGER) 3-0 02 MG per tablet    • famotidine (PEPCID) 20 mg tablet Take 1 tablet (20 mg total) by mouth daily at bedtime   • pantoprazole (PROTONIX) 40 mg tablet Take 1 tablet (40 mg total) by mouth daily   • sucralfate (CARAFATE) 1 g tablet Take 1 tablet (1 g total) by mouth 4 (four) times a day (Patient not taking: Reported on 5/12/2023)     No current facility-administered medications on file prior to visit  She has No Known Allergies       Review of Systems   Constitutional: Negative for activity change and appetite change     HENT: Negative for congestion, "hearing loss, sore throat and trouble swallowing  Eyes: Negative for discharge and visual disturbance  Respiratory: Negative for cough, chest tightness, shortness of breath and wheezing  Cardiovascular: Negative for chest pain and palpitations  Gastrointestinal: Positive for abdominal pain  Per hpi   Endocrine: Negative for cold intolerance and heat intolerance  Genitourinary: Negative for difficulty urinating  Musculoskeletal: Negative for gait problem  Skin: Negative for color change, rash and wound  Neurological: Negative for dizziness, speech difficulty and headaches  Psychiatric/Behavioral: Negative for behavioral problems and confusion  The patient is not nervous/anxious  Objective:      /70 (BP Location: Right arm, Patient Position: Sitting, Cuff Size: Standard)   Pulse 103   Temp 98 2 °F (36 8 °C)   Resp 18   Ht 5' 8\" (1 727 m)   Wt 94 kg (207 lb 3 2 oz)   SpO2 98%   BMI 31 50 kg/m²     Below is the patient's most recent value for Albumin, ALT, AST, BUN, Calcium, Chloride, Cholesterol, CO2, Creatinine, GFR, Glucose, HDL, Hematocrit, Hemoglobin, Hemoglobin A1C, LDL, Magnesium, Phosphorus, Platelets, Potassium, PSA, Sodium, Triglycerides, and WBC  Lab Results   Component Value Date    ALT 17 03/13/2023    AST 13 03/13/2023    BUN 8 03/13/2023    CALCIUM 9 4 03/13/2023     03/13/2023    CO2 24 03/13/2023    CREATININE 0 65 03/13/2023    HCT 38 6 03/13/2023    HGB 13 1 03/13/2023    MG 2 1 12/26/2020     03/13/2023    K 3 4 (L) 03/13/2023    WBC 11 51 (H) 03/13/2023     Note: for a comprehensive list of the patient's lab results, access the Results Review activity  Physical Exam  Vitals and nursing note reviewed  Constitutional:       General: She is not in acute distress  Appearance: She is well-developed  She is not diaphoretic  HENT:      Head: Normocephalic and atraumatic     Eyes:      Pupils: Pupils are equal, round, and reactive to " light  Cardiovascular:      Rate and Rhythm: Normal rate and regular rhythm  Pulmonary:      Effort: Pulmonary effort is normal  No respiratory distress  Abdominal:      Palpations: Abdomen is soft  Musculoskeletal:         General: Normal range of motion  Cervical back: Normal range of motion and neck supple  Skin:     General: Skin is warm and dry  Neurological:      Mental Status: She is alert and oriented to person, place, and time  Psychiatric:         Mood and Affect: Mood normal          Behavior: Behavior normal          Thought Content:  Thought content normal          Judgment: Judgment normal              Procedures

## 2023-05-12 NOTE — H&P (VIEW-ONLY)
RUQ U/S:   LIVER:  Size:  Within normal range  The liver measures 15 7 cm in the midclavicular line  Contour:  Surface contour is smooth  Parenchyma:  Echogenicity and echotexture are within normal limits  Mild increased periportal echogenicity seen, nonspecific finding  No liver mass identified  Limited imaging of the main portal vein shows it to be patent and hepatopetal      BILIARY:  No gallbladder findings  No cholelithiasis  Sonographic Franks's sign is negative  Gallbladder wall thickness is within normal limits  No intrahepatic biliary dilatation  CBD measures 4 0 mm  No choledocholithiasis      KIDNEY:   Right kidney measures 11 7 x 3 6 x 4 7 cm  Volume 103 5 mL  Kidney within normal limits      ASCITES:   None      IMPRESSION:     No cholelithiasis  No biliary dilation  No tenderness on sonographic palpation    NM Hepatobiliary:  FINDINGS:     There is prompt, uniform accumulation with normal clearance of the radiopharmaceutical by the liver  There is normal filling of the intrahepatic ducts, common bile duct and gallbladder with normal excretion of the radiopharmaceutical into the duodenum      In order to evaluate the contractile response of the gallbladder to  cholecystokinin stimulation, 1 8 mcg sincalide (0 02 mcg/kg) was mixed with saline for a total of 60 cc and administered by slow intravenous infusion up to 60 minutes  These images   demonstrate abnormal contraction of the gallbladder  The calculated gallbladder ejection fraction is 9% (N = >38%)      The patient experienced nausea/sweating symptoms after CCK administration      IMPRESSION:     Severely reduced gallbladder ejection fraction of 9% at 60 minutes  Findings are consistent with chronic cholecystitis/biliary dyskinesia in the appropriate clinical setting  Assessment/Plan:     1   Biliary dyskinesia  -     Ambulatory Referral to General Surgery  -     Case request operating room: CHOLECYSTECTOMY LAPAROSCOPIC; Standing  - Comprehensive metabolic panel; Future  -     CBC and differential; Future      The risks and benefits of cholecystectomy were discussed and the plan for laparoscopic cholecystectomy was outlined with the use a picture for visual aid  We discussed the risks not limited to bleeding, infection, bile duct injury and reactions to anesthetic medications  We discussed the anticipated approach and incisions and the anticipated postoperative course  Patient's questions were answered and a consent form was signed  Subjective:      Patient ID: Paz Castorena is a 26 y o  female  Triage Notes:    Ankit Mustafa is presenting with RUQ pain and biliary dyskinesia  And into her shoulder blade  Eating  Only prior procedure wisdom teeth extraction  The following portions of the patient's history were reviewed and updated as appropriate:   She  has a past medical history of Allergic, Back pain, and Neck pain  She There are no problems to display for this patient  She  has a past surgical history that includes [place] tooth extraction  Her family history includes No Known Problems in her brother and mother  She  reports that she has never smoked  She has never used smokeless tobacco  She reports current alcohol use  She reports that she does not use drugs  Current Outpatient Medications on File Prior to Visit   Medication Sig   • drospirenone-ethinyl estradiol (HOLGER) 3-0 02 MG per tablet    • famotidine (PEPCID) 20 mg tablet Take 1 tablet (20 mg total) by mouth daily at bedtime   • pantoprazole (PROTONIX) 40 mg tablet Take 1 tablet (40 mg total) by mouth daily   • sucralfate (CARAFATE) 1 g tablet Take 1 tablet (1 g total) by mouth 4 (four) times a day (Patient not taking: Reported on 5/12/2023)     No current facility-administered medications on file prior to visit  She has No Known Allergies       Review of Systems   Constitutional: Negative for activity change and appetite change     HENT: Negative for congestion, "hearing loss, sore throat and trouble swallowing  Eyes: Negative for discharge and visual disturbance  Respiratory: Negative for cough, chest tightness, shortness of breath and wheezing  Cardiovascular: Negative for chest pain and palpitations  Gastrointestinal: Positive for abdominal pain  Per hpi   Endocrine: Negative for cold intolerance and heat intolerance  Genitourinary: Negative for difficulty urinating  Musculoskeletal: Negative for gait problem  Skin: Negative for color change, rash and wound  Neurological: Negative for dizziness, speech difficulty and headaches  Psychiatric/Behavioral: Negative for behavioral problems and confusion  The patient is not nervous/anxious  Objective:      /70 (BP Location: Right arm, Patient Position: Sitting, Cuff Size: Standard)   Pulse 103   Temp 98 2 °F (36 8 °C)   Resp 18   Ht 5' 8\" (1 727 m)   Wt 94 kg (207 lb 3 2 oz)   SpO2 98%   BMI 31 50 kg/m²     Below is the patient's most recent value for Albumin, ALT, AST, BUN, Calcium, Chloride, Cholesterol, CO2, Creatinine, GFR, Glucose, HDL, Hematocrit, Hemoglobin, Hemoglobin A1C, LDL, Magnesium, Phosphorus, Platelets, Potassium, PSA, Sodium, Triglycerides, and WBC  Lab Results   Component Value Date    ALT 17 03/13/2023    AST 13 03/13/2023    BUN 8 03/13/2023    CALCIUM 9 4 03/13/2023     03/13/2023    CO2 24 03/13/2023    CREATININE 0 65 03/13/2023    HCT 38 6 03/13/2023    HGB 13 1 03/13/2023    MG 2 1 12/26/2020     03/13/2023    K 3 4 (L) 03/13/2023    WBC 11 51 (H) 03/13/2023     Note: for a comprehensive list of the patient's lab results, access the Results Review activity  Physical Exam  Vitals and nursing note reviewed  Constitutional:       General: She is not in acute distress  Appearance: She is well-developed  She is not diaphoretic  HENT:      Head: Normocephalic and atraumatic     Eyes:      Pupils: Pupils are equal, round, and reactive to " light  Cardiovascular:      Rate and Rhythm: Normal rate and regular rhythm  Pulmonary:      Effort: Pulmonary effort is normal  No respiratory distress  Abdominal:      Palpations: Abdomen is soft  Musculoskeletal:         General: Normal range of motion  Cervical back: Normal range of motion and neck supple  Skin:     General: Skin is warm and dry  Neurological:      Mental Status: She is alert and oriented to person, place, and time  Psychiatric:         Mood and Affect: Mood normal          Behavior: Behavior normal          Thought Content:  Thought content normal          Judgment: Judgment normal              Procedures

## 2023-05-23 ENCOUNTER — APPOINTMENT (OUTPATIENT)
Dept: LAB | Facility: HOSPITAL | Age: 23
End: 2023-05-23

## 2023-05-23 ENCOUNTER — TELEPHONE (OUTPATIENT)
Dept: SURGERY | Facility: CLINIC | Age: 23
End: 2023-05-23

## 2023-05-23 DIAGNOSIS — K82.8 BILIARY DYSKINESIA: ICD-10-CM

## 2023-05-23 LAB
ALBUMIN SERPL BCP-MCNC: 4.3 G/DL (ref 3.5–5)
ALP SERPL-CCNC: 58 U/L (ref 34–104)
ALT SERPL W P-5'-P-CCNC: 23 U/L (ref 7–52)
ANION GAP SERPL CALCULATED.3IONS-SCNC: 9 MMOL/L (ref 4–13)
AST SERPL W P-5'-P-CCNC: 18 U/L (ref 13–39)
BASOPHILS # BLD AUTO: 0.05 THOUSANDS/ÂΜL (ref 0–0.1)
BASOPHILS NFR BLD AUTO: 1 % (ref 0–1)
BILIRUB SERPL-MCNC: 0.46 MG/DL (ref 0.2–1)
BUN SERPL-MCNC: 6 MG/DL (ref 5–25)
CALCIUM SERPL-MCNC: 8.9 MG/DL (ref 8.4–10.2)
CHLORIDE SERPL-SCNC: 105 MMOL/L (ref 96–108)
CO2 SERPL-SCNC: 22 MMOL/L (ref 21–32)
CREAT SERPL-MCNC: 0.76 MG/DL (ref 0.6–1.3)
EOSINOPHIL # BLD AUTO: 0.35 THOUSAND/ÂΜL (ref 0–0.61)
EOSINOPHIL NFR BLD AUTO: 5 % (ref 0–6)
ERYTHROCYTE [DISTWIDTH] IN BLOOD BY AUTOMATED COUNT: 11.7 % (ref 11.6–15.1)
GFR SERPL CREATININE-BSD FRML MDRD: 112 ML/MIN/1.73SQ M
GLUCOSE P FAST SERPL-MCNC: 96 MG/DL (ref 65–99)
HCT VFR BLD AUTO: 41 % (ref 34.8–46.1)
HGB BLD-MCNC: 14.2 G/DL (ref 11.5–15.4)
IMM GRANULOCYTES # BLD AUTO: 0.03 THOUSAND/UL (ref 0–0.2)
IMM GRANULOCYTES NFR BLD AUTO: 1 % (ref 0–2)
LYMPHOCYTES # BLD AUTO: 1.68 THOUSANDS/ÂΜL (ref 0.6–4.47)
LYMPHOCYTES NFR BLD AUTO: 26 % (ref 14–44)
MCH RBC QN AUTO: 29.7 PG (ref 26.8–34.3)
MCHC RBC AUTO-ENTMCNC: 34.6 G/DL (ref 31.4–37.4)
MCV RBC AUTO: 86 FL (ref 82–98)
MONOCYTES # BLD AUTO: 0.74 THOUSAND/ÂΜL (ref 0.17–1.22)
MONOCYTES NFR BLD AUTO: 11 % (ref 4–12)
NEUTROPHILS # BLD AUTO: 3.62 THOUSANDS/ÂΜL (ref 1.85–7.62)
NEUTS SEG NFR BLD AUTO: 56 % (ref 43–75)
NRBC BLD AUTO-RTO: 0 /100 WBCS
PLATELET # BLD AUTO: 315 THOUSANDS/UL (ref 149–390)
PMV BLD AUTO: 9.2 FL (ref 8.9–12.7)
POTASSIUM SERPL-SCNC: 3.8 MMOL/L (ref 3.5–5.3)
PROT SERPL-MCNC: 7 G/DL (ref 6.4–8.4)
RBC # BLD AUTO: 4.78 MILLION/UL (ref 3.81–5.12)
SODIUM SERPL-SCNC: 136 MMOL/L (ref 135–147)
WBC # BLD AUTO: 6.47 THOUSAND/UL (ref 4.31–10.16)

## 2023-05-23 NOTE — PRE-PROCEDURE INSTRUCTIONS
Pre-Surgery Instructions:   Medication Instructions   • drospirenone-ethinyl estradiol (HOLGER) 3-0 02 MG per tablet Take day of surgery  • pantoprazole (PROTONIX) 40 mg tablet Take day of surgery  • sucralfate (CARAFATE) 1 g tablet Hold day of surgery  Review with patient via phone medications and showering instructions  Advised don't take NSAID's, ok tylenol products  Advised ASC call with surgery schedule time, nothing eat or drink after midnight  Verbalized understanding

## 2023-05-24 ENCOUNTER — ANESTHESIA (OUTPATIENT)
Dept: PERIOP | Facility: HOSPITAL | Age: 23
End: 2023-05-24

## 2023-05-24 ENCOUNTER — HOSPITAL ENCOUNTER (OUTPATIENT)
Facility: HOSPITAL | Age: 23
Setting detail: OUTPATIENT SURGERY
Discharge: HOME | End: 2023-05-24
Attending: SURGERY | Admitting: SURGERY
Payer: COMMERCIAL

## 2023-05-24 ENCOUNTER — ANESTHESIA EVENT (OUTPATIENT)
Dept: PERIOP | Facility: HOSPITAL | Age: 23
End: 2023-05-24

## 2023-05-24 VITALS
SYSTOLIC BLOOD PRESSURE: 121 MMHG | TEMPERATURE: 97.2 F | HEART RATE: 72 BPM | DIASTOLIC BLOOD PRESSURE: 69 MMHG | OXYGEN SATURATION: 99 % | WEIGHT: 209.66 LBS | BODY MASS INDEX: 31.78 KG/M2 | HEIGHT: 68 IN | RESPIRATION RATE: 16 BRPM

## 2023-05-24 DIAGNOSIS — K82.8 BILIARY DYSKINESIA: ICD-10-CM

## 2023-05-24 LAB
EXT PREGNANCY TEST URINE: NEGATIVE
EXT. CONTROL: NORMAL

## 2023-05-24 PROCEDURE — 88304 TISSUE EXAM BY PATHOLOGIST: CPT | Performed by: SPECIALIST

## 2023-05-24 PROCEDURE — 47562 LAPAROSCOPIC CHOLECYSTECTOMY: CPT

## 2023-05-24 PROCEDURE — 47562 LAPAROSCOPIC CHOLECYSTECTOMY: CPT | Performed by: SURGERY

## 2023-05-24 PROCEDURE — 81025 URINE PREGNANCY TEST: CPT | Performed by: SURGERY

## 2023-05-24 RX ORDER — MAGNESIUM HYDROXIDE 1200 MG/15ML
LIQUID ORAL AS NEEDED
Status: DISCONTINUED | OUTPATIENT
Start: 2023-05-24 | End: 2023-05-24 | Stop reason: HOSPADM

## 2023-05-24 RX ORDER — SODIUM CHLORIDE, SODIUM LACTATE, POTASSIUM CHLORIDE, CALCIUM CHLORIDE 600; 310; 30; 20 MG/100ML; MG/100ML; MG/100ML; MG/100ML
125 INJECTION, SOLUTION INTRAVENOUS CONTINUOUS
Status: DISCONTINUED | OUTPATIENT
Start: 2023-05-24 | End: 2023-05-24 | Stop reason: HOSPADM

## 2023-05-24 RX ORDER — NEOSTIGMINE METHYLSULFATE 1 MG/ML
INJECTION INTRAVENOUS AS NEEDED
Status: DISCONTINUED | OUTPATIENT
Start: 2023-05-24 | End: 2023-05-24

## 2023-05-24 RX ORDER — PROPOFOL 10 MG/ML
INJECTION, EMULSION INTRAVENOUS AS NEEDED
Status: DISCONTINUED | OUTPATIENT
Start: 2023-05-24 | End: 2023-05-24

## 2023-05-24 RX ORDER — DEXAMETHASONE SODIUM PHOSPHATE 10 MG/ML
INJECTION, SOLUTION INTRAMUSCULAR; INTRAVENOUS AS NEEDED
Status: DISCONTINUED | OUTPATIENT
Start: 2023-05-24 | End: 2023-05-24

## 2023-05-24 RX ORDER — DOCUSATE SODIUM 100 MG/1
100 CAPSULE, LIQUID FILLED ORAL 2 TIMES DAILY
Qty: 20 CAPSULE | Refills: 0 | Status: SHIPPED | OUTPATIENT
Start: 2023-05-24

## 2023-05-24 RX ORDER — LABETALOL HYDROCHLORIDE 5 MG/ML
5 INJECTION, SOLUTION INTRAVENOUS
Status: DISCONTINUED | OUTPATIENT
Start: 2023-05-24 | End: 2023-05-24 | Stop reason: HOSPADM

## 2023-05-24 RX ORDER — KETOROLAC TROMETHAMINE 30 MG/ML
INJECTION, SOLUTION INTRAMUSCULAR; INTRAVENOUS AS NEEDED
Status: DISCONTINUED | OUTPATIENT
Start: 2023-05-24 | End: 2023-05-24

## 2023-05-24 RX ORDER — PROMETHAZINE HYDROCHLORIDE 25 MG/ML
12.5 INJECTION, SOLUTION INTRAMUSCULAR; INTRAVENOUS ONCE AS NEEDED
Status: DISCONTINUED | OUTPATIENT
Start: 2023-05-24 | End: 2023-05-24 | Stop reason: HOSPADM

## 2023-05-24 RX ORDER — LIDOCAINE HYDROCHLORIDE 10 MG/ML
0.5 INJECTION, SOLUTION EPIDURAL; INFILTRATION; INTRACAUDAL; PERINEURAL ONCE AS NEEDED
Status: DISCONTINUED | OUTPATIENT
Start: 2023-05-24 | End: 2023-05-24 | Stop reason: HOSPADM

## 2023-05-24 RX ORDER — ALBUTEROL SULFATE 2.5 MG/3ML
2.5 SOLUTION RESPIRATORY (INHALATION) ONCE AS NEEDED
Status: DISCONTINUED | OUTPATIENT
Start: 2023-05-24 | End: 2023-05-24 | Stop reason: HOSPADM

## 2023-05-24 RX ORDER — ONDANSETRON 2 MG/ML
4 INJECTION INTRAMUSCULAR; INTRAVENOUS ONCE AS NEEDED
Status: DISCONTINUED | OUTPATIENT
Start: 2023-05-24 | End: 2023-05-24 | Stop reason: HOSPADM

## 2023-05-24 RX ORDER — LIDOCAINE HYDROCHLORIDE 20 MG/ML
INJECTION, SOLUTION EPIDURAL; INFILTRATION; INTRACAUDAL; PERINEURAL AS NEEDED
Status: DISCONTINUED | OUTPATIENT
Start: 2023-05-24 | End: 2023-05-24

## 2023-05-24 RX ORDER — HYDROMORPHONE HCL/PF 1 MG/ML
0.5 SYRINGE (ML) INJECTION
Status: DISCONTINUED | OUTPATIENT
Start: 2023-05-24 | End: 2023-05-24 | Stop reason: HOSPADM

## 2023-05-24 RX ORDER — FENTANYL CITRATE 50 UG/ML
INJECTION, SOLUTION INTRAMUSCULAR; INTRAVENOUS AS NEEDED
Status: DISCONTINUED | OUTPATIENT
Start: 2023-05-24 | End: 2023-05-24

## 2023-05-24 RX ORDER — CEFAZOLIN SODIUM 2 G/50ML
2000 SOLUTION INTRAVENOUS ONCE
Status: COMPLETED | OUTPATIENT
Start: 2023-05-24 | End: 2023-05-24

## 2023-05-24 RX ORDER — HYDROCODONE BITARTRATE AND ACETAMINOPHEN 5; 325 MG/1; MG/1
1 TABLET ORAL EVERY 6 HOURS PRN
Qty: 12 TABLET | Refills: 0 | Status: SHIPPED | OUTPATIENT
Start: 2023-05-24 | End: 2023-06-03

## 2023-05-24 RX ORDER — ROCURONIUM BROMIDE 10 MG/ML
INJECTION, SOLUTION INTRAVENOUS AS NEEDED
Status: DISCONTINUED | OUTPATIENT
Start: 2023-05-24 | End: 2023-05-24

## 2023-05-24 RX ORDER — MEPERIDINE HYDROCHLORIDE 50 MG/ML
12.5 INJECTION INTRAMUSCULAR; INTRAVENOUS; SUBCUTANEOUS ONCE
Status: DISCONTINUED | OUTPATIENT
Start: 2023-05-24 | End: 2023-05-24 | Stop reason: HOSPADM

## 2023-05-24 RX ORDER — FENTANYL CITRATE/PF 50 MCG/ML
25 SYRINGE (ML) INJECTION
Status: DISCONTINUED | OUTPATIENT
Start: 2023-05-24 | End: 2023-05-24 | Stop reason: HOSPADM

## 2023-05-24 RX ORDER — MIDAZOLAM HYDROCHLORIDE 2 MG/2ML
INJECTION, SOLUTION INTRAMUSCULAR; INTRAVENOUS AS NEEDED
Status: DISCONTINUED | OUTPATIENT
Start: 2023-05-24 | End: 2023-05-24

## 2023-05-24 RX ORDER — ONDANSETRON 2 MG/ML
INJECTION INTRAMUSCULAR; INTRAVENOUS AS NEEDED
Status: DISCONTINUED | OUTPATIENT
Start: 2023-05-24 | End: 2023-05-24

## 2023-05-24 RX ORDER — GLYCOPYRROLATE 0.2 MG/ML
INJECTION INTRAMUSCULAR; INTRAVENOUS AS NEEDED
Status: DISCONTINUED | OUTPATIENT
Start: 2023-05-24 | End: 2023-05-24

## 2023-05-24 RX ADMIN — FENTANYL CITRATE 100 MCG: 50 INJECTION, SOLUTION INTRAMUSCULAR; INTRAVENOUS at 12:29

## 2023-05-24 RX ADMIN — GLYCOPYRROLATE 0.4 MG: 0.2 INJECTION, SOLUTION INTRAMUSCULAR; INTRAVENOUS at 13:04

## 2023-05-24 RX ADMIN — KETOROLAC TROMETHAMINE 30 MG: 30 INJECTION, SOLUTION INTRAMUSCULAR at 13:18

## 2023-05-24 RX ADMIN — SODIUM CHLORIDE, SODIUM LACTATE, POTASSIUM CHLORIDE, AND CALCIUM CHLORIDE 125 ML/HR: .6; .31; .03; .02 INJECTION, SOLUTION INTRAVENOUS at 12:16

## 2023-05-24 RX ADMIN — ONDANSETRON 4 MG: 2 INJECTION INTRAMUSCULAR; INTRAVENOUS at 13:01

## 2023-05-24 RX ADMIN — HYDROMORPHONE HYDROCHLORIDE 0.5 MG: 1 INJECTION, SOLUTION INTRAMUSCULAR; INTRAVENOUS; SUBCUTANEOUS at 13:49

## 2023-05-24 RX ADMIN — FENTANYL CITRATE 25 MCG: 50 INJECTION, SOLUTION INTRAMUSCULAR; INTRAVENOUS at 13:39

## 2023-05-24 RX ADMIN — LIDOCAINE HYDROCHLORIDE 100 MG: 20 INJECTION, SOLUTION EPIDURAL; INFILTRATION; INTRACAUDAL at 12:29

## 2023-05-24 RX ADMIN — ROCURONIUM BROMIDE 50 MG: 10 INJECTION, SOLUTION INTRAVENOUS at 12:31

## 2023-05-24 RX ADMIN — PROPOFOL 200 MG: 10 INJECTION, EMULSION INTRAVENOUS at 12:30

## 2023-05-24 RX ADMIN — CEFAZOLIN SODIUM 2000 MG: 2 SOLUTION INTRAVENOUS at 12:16

## 2023-05-24 RX ADMIN — FENTANYL CITRATE 25 MCG: 50 INJECTION, SOLUTION INTRAMUSCULAR; INTRAVENOUS at 13:43

## 2023-05-24 RX ADMIN — MIDAZOLAM 2 MG: 1 INJECTION INTRAMUSCULAR; INTRAVENOUS at 13:21

## 2023-05-24 RX ADMIN — MIDAZOLAM 2 MG: 1 INJECTION INTRAMUSCULAR; INTRAVENOUS at 12:25

## 2023-05-24 RX ADMIN — NEOSTIGMINE METHYLSULFATE 3 MG: 1 INJECTION INTRAVENOUS at 13:04

## 2023-05-24 RX ADMIN — DEXAMETHASONE SODIUM PHOSPHATE 10 MG: 10 INJECTION, SOLUTION INTRAMUSCULAR; INTRAVENOUS at 12:39

## 2023-05-24 NOTE — DISCHARGE INSTR - AVS FIRST PAGE
Laparoscopic Cholecystectomy   AMBULATORY CARE:   What you need to know about a laparoscopic cholecystectomy:  Laparoscopic cholecystectomy is surgery to remove your gallbladder  What will happen during surgery: Your surgeon will make 4 small incisions in your abdomen or belly button  She will insert small tools into the incisions  Your abdomen will be filled with carbon dioxide gas to make room  This helps your surgeon see your organs better and gives more room to move the tools around  Your surgeon will look for and remove gallstones in and around your gallbladder  X-rays or an ultrasound may be used  Your surgeon will remove your gallbladder through one of the incisions  The carbon dioxide will be released from your abdomen  The incisions will be closed with stitches and adhesive strips, then covered with bandages  What to expect after surgery: You will be taken to a recovery room until you are fully awake  Healthcare providers will monitor you closely for any problems  Providers will help you walk around to prevent blood clots  You will be able to go home later the same day  Pain, a sore throat, nausea, and vomiting are common after this surgery  These should get better within a few days  You may also have diarrhea that lasts up to a few months  Medicines may be given to prevent or treat pain, nausea, and vomiting  Medicines may also be given to prevent a bacterial infection  Blood thinners may be given to prevent blood clots  You may be bleed or bruise more easily while you are taking blood thinners  You can remove the outer guaze and tape in 48 hours  You need to take showers instead of baths for a week  Your surgeon will tell you when you can drive, return to work, and do your regular daily activities  You will be shown how to care for the surgery area and check for signs of infection   You will also be told which foods to eat in the days and weeks after surgery  Risks of a laparoscopic cholecystectomy:   You could bleed more than expected or get an infection  Any carbon dioxide gas still in your body can cause neck and shoulder pain  Your gallbladder may leak bile into your abdomen during or after surgery  This can cause a severe infection or an abscess  You may still have gallstones after surgery  You may need a different procedure to remove them  Your surgeon may need to make a larger incision than expected during surgery  Your bile duct, bowel, or other organs could be damaged during surgery  This can be life-threatening  Call your local emergency number (911 in the [address] [facility]) if:   You feel lightheaded, short of breath, and have chest pain  You cough up blood  Seek care immediately if:   Your arm or leg feels warm, tender, and painful  It may look swollen and red  You cannot stop vomiting  Your bowel movements are black or bloody  You have pain in your abdomen and it is swollen or hard  You have a fever over 101°F (38°C) or chills  Call your doctor or surgeon if:   You have pain or nausea that is not relieved by medicine  You have redness and swelling around your incision sites  You have blood or pus leaking from your incision sites  You are constipated, have diarrhea, or your bowel movements are pale  Your skin or eyes are yellow  You have questions or concerns about your surgery, condition, or care  Medicines: You may need any of the following:  Prescription pain medicine  may be given  Ask your healthcare provider how to take this medicine safely  Some prescription pain medicines contain acetaminophen (tylenol)  Do not take other medicines that contain acetaminophen (tylenol) without talking to your healthcare provider  Too much acetaminophen may cause liver damage  The usual healthy dose for an adult is less than 4000mg over the course of a day  Prescription pain medicine may cause constipation   Ask your healthcare provider how to prevent or treat constipation  NSAIDs  help decrease swelling and pain or fever  This medicine is available with or without a doctor's order  NSAIDs can cause stomach bleeding or kidney problems in certain people  If you take blood thinner medicine, always ask your healthcare provider if NSAIDs are safe for you  Always read the medicine label and follow directions  Take your medicine as directed  Contact your healthcare provider if you think your medicine is not helping or if you have side effects  Tell him or her if you are allergic to any medicine  Keep a list of the medicines, vitamins, and herbs you take  Include the amounts, and when and why you take them  Bring the list or the pill bottles to follow-up visits  Carry your medicine list with you in case of an emergency  Take deep breaths and cough 10 times each hour: This will decrease your risk for a lung infection  Take a deep breath and hold it for as long as you can  Then let the air out and cough strongly  You may be given an incentive spirometer to help you take deep breaths  Put the plastic piece in your mouth and take a slow, deep breath  Then let the air out and cough  Repeat these steps 10 times every hour  Care for the surgery area:   Remove the bandages as directed  Your surgeon may tell you to remove the bandages the day after surgery  Keep the area clean and dry  You may take a shower the day after your surgery  Do not take baths, swim, or soak in a hot tub until your surgeon says it is okay  Check for signs of infection each day  Check the area for swelling, red streaks, or pus  Tell your surgeon right away if you see any of these  Hug a pillow against the surgery area before you sneeze or cough  This will help prevent pain and protect the surgery area  What to eat after surgery:   Eat low-fat foods for 4 to 6 weeks  while your body learns to digest fat without a gallbladder   Slowly increase the amount of fat that you eat  Drink more liquids  Ask how much liquid to drink and which liquids are best for you  When to return to work and other activities:   Rest often  and slowly increase your activity level each day  If an activity causes pain, wait several days before you do that activity again  Do not drive  for the first 24 hours after surgery  Your surgeon will tell you when it is okay to drive after the first 24 hours  This is usually after you have stopped taking narcotic pain medicine for a few days  You must be comfortable wearing a seatbelt and turning to check your blind spot  Do not lift anything heavier than 20 pounds  for 2 weeks, or as directed  You may return to work or other activities  as soon as your pain is controlled and you feel comfortable  This is usually 5 to 7 days after surgery as long as your work does not involve very heavy lifting  Follow up with your doctor or surgeon as directed:  Make an appointment for 2 weeks if one was not already given  Write down your questions so you remember to ask them during your visits  © Copyright [employer] 2022 Information is for End User's use only and may not be sold, redistributed or otherwise used for commercial purposes  All illustrations and images included in CareNotes® are the copyrighted property of [employer]  or Brian Velez   The above information is an  only  It is not intended as medical advice for individual conditions or treatments  Talk to your doctor, nurse or pharmacist before following any medical regimen to see if it is safe and effective for you

## 2023-05-24 NOTE — ANESTHESIA PREPROCEDURE EVALUATION
Procedure:  CHOLECYSTECTOMY LAPAROSCOPIC (Abdomen)    Relevant Problems   No relevant active problems        Physical Exam    Airway    Mallampati score: II  TM Distance: >3 FB  Neck ROM: full     Dental   No notable dental hx     Cardiovascular  Cardiovascular exam normal    Pulmonary  Pulmonary exam normal     Other Findings        Anesthesia Plan  ASA Score- 1     Anesthesia Type- general with ASA Monitors  Additional Monitors:   Airway Plan: ETT  Comment: Patient seen and examined  History reviewed  Patient to be done under general anesthesia with ETT and routine monitors  Risks discussed with the patient  Consent obtained          Plan Factors-Exercise tolerance (METS): >4 METS  Chart reviewed  Existing labs reviewed  Patient summary reviewed  Patient is a current smoker  Patient instructed to abstain from smoking on day of procedure  Patient did not smoke on day of surgery  Induction- intravenous  Postoperative Plan- Plan for postoperative opioid use  Planned trial extubation    Informed Consent- Anesthetic plan and risks discussed with patient  I personally reviewed this patient with the CRNA  Discussed and agreed on the Anesthesia Plan with the CRNA  Sylvester Becerra

## 2023-05-24 NOTE — OP NOTE
OPERATIVE REPORT  PATIENT NAME: Daisy Ramon    :  2000  MRN: [id number]  Pt Location: [facility] OR ROOM 02    SURGERY DATE: 2023    Surgeon(s) and Role:     * Rashel Crawley MD - Primary     * Lubna Adair PA-C - Assisting    Preop Diagnosis:  Biliary dyskinesia [K82 8]    Post-Op Diagnosis Codes:     * Biliary dyskinesia [K82 8]    Procedure(s):  CHOLECYSTECTOMY LAPAROSCOPIC    Specimen(s):  ID Type Source Tests Collected by Time Destination   1 : Gallbladder and Contents  Tissue Gallbladder TISSUE EXAM Rashel Crawley MD 2023 [id number]        Estimated Blood Loss:   Minimal    Drains:  NG/OG/Enteral Tube Orogastric 18 Fr Right mouth (Active)   Number of days: 0       Anesthesia Type:   General    Operative Indications:  Biliary dyskinesia [K82 8]      Operative Findings:  Adhesions from the liver to the abdominal wall and gallbladder to the duodenum    Complications:   None    Procedure and Technique:  The patient was brought to the operating room and placed in supine position  The patient  was sedated and intubated and preoperative antibiotics were given  The abdomen was prepped and draped in the usual sterile fashion  A time-out was carried out and initiated by myself and confirmed the correct patient, procedure, antibiotics any additional concerns  In the infra-umbilical position a combination of 1% lidocaine and 0 25% Marcaine was instilled  An 11 blade was used to make a 10 mm incision  This was carried down to the fascia and a Mahala Prime was used to grasp the umbilical stalk  The fascia was incised in the midline using the 11 blade  A 0 Vicryl on a UR6 needle was used to place a Hilton port and the abdomen was insufflated  Additional 5 mm ports were placed in the subxiphoid and right subcostal positions  The gallbladder was grasped with a locking grasper and retracted cephalad  The adhesions to the duodenum were taken down using the hook electrocautery   The gallbladder was grasped and the triangle of calot was dissected using a maryland   Using a maryland - the critical view was obtained  Clips were used to clip and then ligate the duct and artery  The gallbladder was then taken off of the gallbladder fossa using the hook electrocautery  The gallbladder was placed in an Endo-Catch bag and removed from the abdomen  Under direct vision the 5 mm ports were removed and the abdomen was desufflated  The Balta port was then also removed and the Vicryl sutures were tied in the midline to close the umbilical port site  A 4 0 Monocryl was used to close the skin of all of the ports  Steris, guaze and tegaderm was placed over the incisions  The patient was then awakened and transported over to the stretcher and to PACU  I was present for the entire procedure , A qualified resident physician was not available  and A physician assistant was required during the procedure for retraction, tissue handling, dissection and suturing      Patient Disposition:  PACU  and extubated and stable        SIGNATURE: Tamar Brice MD  DATE: May 24, 2023  TIME: 1:14 PM

## 2023-05-24 NOTE — ANESTHESIA POSTPROCEDURE EVALUATION
Post-Op Assessment Note    CV Status:  Stable    Pain management: adequate     Mental Status:  Alert and awake   Hydration Status:  Euvolemic   PONV Controlled:  Controlled   Airway Patency:  Patent      Post Op Vitals Reviewed: Yes      Staff: CRNA         No notable events documented      BP (P) 157/93 (05/24/23 1319)    Temp (P) 97 8 °F (36 6 °C) (05/24/23 1319)    Pulse (P) 84 (05/24/23 1319)   Resp (P) 18 (05/24/23 1319)    SpO2 (P) 100 % (05/24/23 1319)

## 2023-05-24 NOTE — INTERVAL H&P NOTE
H&P reviewed  After examining the patient I find no changes in the patients condition since the H&P had been written      Vitals:    05/24/23 1141   BP: 134/74   Pulse: 74   Resp: 16   Temp: (!) 97 °F (36 1 °C)   SpO2: 98%

## 2023-05-31 PROCEDURE — 88304 TISSUE EXAM BY PATHOLOGIST: CPT | Performed by: SPECIALIST

## 2023-06-06 ENCOUNTER — OFFICE VISIT (OUTPATIENT)
Dept: SURGERY | Facility: CLINIC | Age: 23
End: 2023-06-06

## 2023-06-06 VITALS
BODY MASS INDEX: 30.83 KG/M2 | OXYGEN SATURATION: 97 % | WEIGHT: 203.4 LBS | HEART RATE: 95 BPM | DIASTOLIC BLOOD PRESSURE: 70 MMHG | SYSTOLIC BLOOD PRESSURE: 118 MMHG | HEIGHT: 68 IN | TEMPERATURE: 98.1 F | RESPIRATION RATE: 16 BRPM

## 2023-06-06 DIAGNOSIS — Z90.49 S/P LAPAROSCOPIC CHOLECYSTECTOMY: Primary | ICD-10-CM

## 2023-06-06 PROCEDURE — 99024 POSTOP FOLLOW-UP VISIT: CPT | Performed by: SURGERY

## 2023-06-06 NOTE — PROGRESS NOTES
Assessment/Plan:    Pathology Results:  Final Diagnosis   A. Gallbladder:  - Chronic cholecystitis. - One benign lymph node (0/1). 1. S/P laparoscopic cholecystectomy        Recovering well after laparoscopic cholecystectomy. No signs or symptoms of infection. Pathology was reviewed and there were no unusual or unexpected findings and no malignancy. Resume regular activity. Followup if needed. Subjective:      Patient ID: Irma Bender is a 26 y.o. female. Triage Notes:    Anastasiya Benitez is following up about 2 weeks after laparoscopic cholecystectomy. Reports feeling well. Pain has improved. Is eating. No diarrhea or constipation. No fevers/chills/drainage/redness. The following portions of the patient's history were reviewed and updated as appropriate: allergies, current medications, past family history, past medical history, past social history, past surgical history and problem list.    Review of Systems      Objective:      /70 (BP Location: Left arm, Patient Position: Sitting, Cuff Size: Standard)   Pulse 95   Temp 98.1 °F (36.7 °C)   Resp 16   Ht 5' 8" (1.727 m)   Wt 92.3 kg (203 lb 6.4 oz)   LMP 05/05/2023 (Approximate)   SpO2 97%   BMI 30.93 kg/m²     Below is the patient's most recent value for Albumin, ALT, AST, BUN, Calcium, Chloride, Cholesterol, CO2, Creatinine, GFR, Glucose, HDL, Hematocrit, Hemoglobin, Hemoglobin A1C, LDL, Magnesium, Phosphorus, Platelets, Potassium, PSA, Sodium, Triglycerides, and WBC. Lab Results   Component Value Date    ALT 23 05/23/2023    AST 18 05/23/2023    BUN 6 05/23/2023    CALCIUM 8.9 05/23/2023     05/23/2023    CO2 22 05/23/2023    CREATININE 0.76 05/23/2023    HCT 41.0 05/23/2023    HGB 14.2 05/23/2023    MG 2.1 12/26/2020     05/23/2023    K 3.8 05/23/2023    WBC 6.47 05/23/2023     Note: for a comprehensive list of the patient's lab results, access the Results Review activity.      Physical Exam  Vitals and nursing note reviewed. Constitutional:       General: She is not in acute distress. Appearance: She is well-developed. She is not diaphoretic. HENT:      Head: Normocephalic and atraumatic. Eyes:      Pupils: Pupils are equal, round, and reactive to light. Cardiovascular:      Rate and Rhythm: Normal rate and regular rhythm. Pulmonary:      Effort: Pulmonary effort is normal.   Abdominal:      General: There is no distension. Palpations: Abdomen is soft. There is no mass. Tenderness: There is no guarding or rebound. Hernia: No hernia is present. Comments: The laparoscopic port sites are clean and dry with no erythema or drainage. Musculoskeletal:         General: Normal range of motion. Cervical back: Normal range of motion and neck supple. Skin:     General: Skin is warm and dry. Neurological:      Mental Status: She is alert and oriented to person, place, and time. Psychiatric:         Behavior: Behavior normal.         Thought Content:  Thought content normal.         Judgment: Judgment normal.             Procedures

## 2023-07-19 ENCOUNTER — VBI (OUTPATIENT)
Dept: ADMINISTRATIVE | Facility: OTHER | Age: 23
End: 2023-07-19

## 2023-08-16 ENCOUNTER — TELEPHONE (OUTPATIENT)
Dept: FAMILY MEDICINE CLINIC | Facility: CLINIC | Age: 23
End: 2023-08-16

## 2023-09-06 ENCOUNTER — OFFICE VISIT (OUTPATIENT)
Dept: FAMILY MEDICINE CLINIC | Facility: CLINIC | Age: 23
End: 2023-09-06
Payer: COMMERCIAL

## 2023-09-06 VITALS
HEIGHT: 68 IN | RESPIRATION RATE: 18 BRPM | DIASTOLIC BLOOD PRESSURE: 72 MMHG | SYSTOLIC BLOOD PRESSURE: 110 MMHG | TEMPERATURE: 99.6 F | BODY MASS INDEX: 29.58 KG/M2 | OXYGEN SATURATION: 98 % | WEIGHT: 195.2 LBS | HEART RATE: 94 BPM

## 2023-09-06 DIAGNOSIS — Z12.4 SCREENING FOR CERVICAL CANCER: ICD-10-CM

## 2023-09-06 DIAGNOSIS — Z71.85 HPV VACCINE COUNSELING: Primary | ICD-10-CM

## 2023-09-06 PROCEDURE — T1015 CLINIC SERVICE: HCPCS | Performed by: FAMILY MEDICINE

## 2023-09-06 NOTE — LETTER
September 6, 2023     Patient: Lawyer Price  YOB: 2000  Date of Visit: 9/6/2023      To Whom it May Concern:    Lawyer Price is under my professional care. Nicole Gilliam was seen in my office on 9/6/2023. Nicole Gilliam has a past medical history of gallbladder disease and neck injury, both of which have since resolved with no remaining symptoms or sequela. Records can be provided on request.     If you have any questions or concerns, please don't hesitate to call.          Sincerely,          Dar Rose MD        CC: No Recipients

## 2023-09-06 NOTE — PROGRESS NOTES
Assessment/Plan:    [employer]  - Patient needed up to date measurements for calculating percent body fat  - Measured and calculated, appropriate documentation filled out  - Otherwise in good health     Diagnoses and all orders for this visit:    HPV vaccine counseling  -     HPV VACCINE 9 VALENT IM    Screening for cervical cancer  -     Ambulatory Referral to Obstetrics / Gynecology; Future          Subjective:      Patient ID: Yonathan Mitchell is a 26 y.o. female. Ms oDreen Anderson is a 22y F with no significant PMH, presenting to clinic for an updated body fat calculation, to be documented for an [employer] physical. The patient was appropriately measured and was calculated to have a a percentage under 35%, all noted on the form the patient brought with her. She is otherwise in good health, with no CP, SOB, HA, N/V, changes in bowel or urinary habits, sick contacts or recent illnesses. Vaccines are up to date, and health screenings will be scheduled as appropriate. The patient does not have any current medical concerns that should prevent her from physically qualifying for the position she is applying for. Records available upon request.       The following portions of the patient's history were reviewed and updated as appropriate: allergies, current medications, past family history, past medical history, past social history, past surgical history and problem list.    Review of Systems   Constitutional: Negative for activity change, appetite change, chills, fatigue, fever and unexpected weight change. HENT: Negative for congestion, ear pain, rhinorrhea and sore throat. Eyes: Negative for pain and visual disturbance. Respiratory: Negative for cough, chest tightness, shortness of breath and wheezing. Cardiovascular: Negative for chest pain, palpitations and leg swelling. Gastrointestinal: Negative for abdominal pain, constipation, diarrhea, nausea and vomiting.    Genitourinary: Negative for dysuria, flank pain and hematuria. Musculoskeletal: Negative for arthralgias, back pain, gait problem and joint swelling. Skin: Negative for color change and rash. Neurological: Negative for dizziness, seizures, syncope and headaches. Psychiatric/Behavioral: Negative for agitation, confusion and sleep disturbance. The patient is not nervous/anxious. All other systems reviewed and are negative. Objective:      /72 (BP Location: Left arm, Patient Position: Sitting, Cuff Size: Standard)   Pulse 94   Temp 99.6 °F (37.6 °C)   Resp 18   Ht 5' 8" (1.727 m)   Wt 88.5 kg (195 lb 3.2 oz)   LMP 08/21/2023 (Approximate)   SpO2 98%   BMI 29.68 kg/m²          Physical Exam  Vitals and nursing note reviewed. Constitutional:       General: She is not in acute distress. Appearance: Normal appearance. She is not ill-appearing. HENT:      Head: Normocephalic and atraumatic. Right Ear: External ear normal.      Left Ear: External ear normal.      Nose: Nose normal.      Mouth/Throat:      Mouth: Mucous membranes are moist.      Pharynx: Oropharynx is clear. Eyes:      Extraocular Movements: Extraocular movements intact. Conjunctiva/sclera: Conjunctivae normal.   Cardiovascular:      Rate and Rhythm: Normal rate and regular rhythm. Pulses: Normal pulses. Heart sounds: Normal heart sounds. No murmur heard. Pulmonary:      Effort: Pulmonary effort is normal.      Breath sounds: Normal breath sounds. No wheezing. Abdominal:      General: Abdomen is flat. Bowel sounds are normal.      Palpations: Abdomen is soft. Tenderness: There is no abdominal tenderness. There is no guarding. Musculoskeletal:         General: No swelling or deformity. Normal range of motion. Cervical back: Normal range of motion and neck supple. No rigidity. Skin:     General: Skin is warm and dry. Findings: No erythema or rash. Neurological:      General: No focal deficit present.       Mental Status: She is alert and oriented to person, place, and time.    Psychiatric:         Mood and Affect: Mood normal.         Behavior: Behavior normal.

## 2023-09-12 ENCOUNTER — VBI (OUTPATIENT)
Dept: ADMINISTRATIVE | Facility: OTHER | Age: 23
End: 2023-09-12

## 2023-09-26 ENCOUNTER — TELEPHONE (OUTPATIENT)
Dept: FAMILY MEDICINE CLINIC | Facility: CLINIC | Age: 23
End: 2023-09-26

## 2023-09-26 NOTE — TELEPHONE ENCOUNTER
Lm on vm to cancel appt.  With hygientist On 10/17/23 and asked patient to call back to reschedule- ss

## 2023-10-25 ENCOUNTER — OFFICE VISIT (OUTPATIENT)
Dept: FAMILY MEDICINE CLINIC | Facility: CLINIC | Age: 23
End: 2023-10-25
Payer: COMMERCIAL

## 2023-10-25 VITALS
HEIGHT: 68 IN | OXYGEN SATURATION: 97 % | DIASTOLIC BLOOD PRESSURE: 68 MMHG | TEMPERATURE: 99.2 F | WEIGHT: 192.4 LBS | HEART RATE: 93 BPM | BODY MASS INDEX: 29.16 KG/M2 | SYSTOLIC BLOOD PRESSURE: 124 MMHG

## 2023-10-25 DIAGNOSIS — Z30.013 ENCOUNTER FOR INITIAL PRESCRIPTION OF INJECTABLE CONTRACEPTIVE: Primary | ICD-10-CM

## 2023-10-25 RX ORDER — MEDROXYPROGESTERONE ACETATE 150 MG/ML
150 INJECTION, SUSPENSION INTRAMUSCULAR
Qty: 1 ML | Refills: 3 | Status: SHIPPED | OUTPATIENT
Start: 2023-10-25

## 2023-10-25 NOTE — PROGRESS NOTES
Assessment/Plan:    Encounter for initial prescription of injectable contraceptive  - Patient counseled on birth control options  - Would like to try Depo-provera  - Will administer today/tomorrow  - F/u 1-2mo  - Pap in 1-2mo     Diagnoses and all orders for this visit:    Encounter for initial prescription of injectable contraceptive  -     medroxyPROGESTERone (DEPO-PROVERA) 150 mg/mL injection; Inject 1 mL (150 mg total) into a muscle every 3 (three) months        Subjective:      Patient ID: Alyce Trevino is a 26 y.o. female. Ms Roney Leslie is a 22y F w no significant PMH, being seen in clinic regarding a change in birth control. The patiently was previously taking Sandra, which she had tolerated well, and has been using OCPs as birth control for almost 5years. She reports some difficulty in remembering to take her daily pill, and would prefer another form of contraception that does not require as frequent  administration. Education given regarding options for contraception, including injectable contraception which the patient prefers at this time. Her mother has had success with depo-provera shots for regulating menses. The patient reports regular menses, with symptoms of dysmenorrhea. No h/o STIs, no h/o pregnancy, uses condoms. No h/o migraines w aura, but does report occasional cervicogenic HA 2/2 prior trauma. Agreeable to initiating depo-provera w/n the next 1-2days as she is currently menstruating, will f/u in 1-2mo, perform pap smear at that time. The following portions of the patient's history were reviewed and updated as appropriate: allergies, current medications, past family history, past medical history, past social history, past surgical history, and problem list.    Review of Systems   Constitutional:  Negative for activity change, chills, fatigue, fever and unexpected weight change. HENT:  Negative for congestion, ear pain, postnasal drip, rhinorrhea and sore throat.     Eyes:  Negative for pain and visual disturbance. Respiratory:  Negative for cough and shortness of breath. Cardiovascular:  Negative for chest pain and palpitations. Gastrointestinal:  Negative for abdominal pain, constipation, diarrhea, nausea and vomiting. Genitourinary:  Negative for dysuria, genital sores, hematuria, menstrual problem, pelvic pain, vaginal bleeding, vaginal discharge and vaginal pain. Musculoskeletal:  Negative for arthralgias, back pain, joint swelling and neck pain. Skin:  Negative for color change and rash. Neurological:  Negative for dizziness, seizures, syncope and headaches. Psychiatric/Behavioral:  Negative for agitation, confusion, dysphoric mood and sleep disturbance. All other systems reviewed and are negative. Objective:      /68 (BP Location: Left arm, Patient Position: Sitting, Cuff Size: Standard)   Pulse 93   Temp 99.2 °F (37.3 °C) (Tympanic)   Ht 5' 8" (1.727 m)   Wt 87.3 kg (192 lb 6.4 oz)   LMP 10/25/2023 (Exact Date)   SpO2 97%   BMI 29.25 kg/m²          Physical Exam  Vitals and nursing note reviewed. Constitutional:       General: She is not in acute distress. Appearance: Normal appearance. She is not ill-appearing. HENT:      Head: Normocephalic and atraumatic. Right Ear: External ear normal.      Left Ear: External ear normal.      Nose: Nose normal.      Mouth/Throat:      Mouth: Mucous membranes are moist.      Pharynx: Oropharynx is clear. Eyes:      Extraocular Movements: Extraocular movements intact. Conjunctiva/sclera: Conjunctivae normal.   Cardiovascular:      Rate and Rhythm: Normal rate and regular rhythm. Pulses: Normal pulses. Heart sounds: Normal heart sounds. Pulmonary:      Effort: Pulmonary effort is normal.      Breath sounds: Normal breath sounds. No wheezing. Abdominal:      General: Abdomen is flat. Bowel sounds are normal.      Palpations: Abdomen is soft. Tenderness:  There is no abdominal tenderness. There is no guarding. Musculoskeletal:         General: No swelling or deformity. Normal range of motion. Cervical back: Normal range of motion and neck supple. No rigidity. Skin:     General: Skin is warm and dry. Findings: No erythema or rash. Neurological:      General: No focal deficit present. Mental Status: She is alert and oriented to person, place, and time.    Psychiatric:         Mood and Affect: Mood normal.         Behavior: Behavior normal.

## 2023-10-26 ENCOUNTER — DOCUMENTATION (OUTPATIENT)
Dept: FAMILY MEDICINE CLINIC | Facility: CLINIC | Age: 23
End: 2023-10-26

## 2023-10-26 ENCOUNTER — CLINICAL SUPPORT (OUTPATIENT)
Dept: FAMILY MEDICINE CLINIC | Facility: CLINIC | Age: 23
End: 2023-10-26

## 2023-10-26 DIAGNOSIS — Z30.013 ENCOUNTER FOR INITIAL PRESCRIPTION OF INJECTABLE CONTRACEPTIVE: Primary | ICD-10-CM

## 2023-10-26 DIAGNOSIS — Z30.42 ENCOUNTER FOR DEPO-PROVERA CONTRACEPTION: ICD-10-CM

## 2023-10-26 LAB — SL AMB POCT URINE HCG: NEGATIVE

## 2023-10-26 RX ORDER — MEDROXYPROGESTERONE ACETATE 150 MG/ML
150 INJECTION, SUSPENSION INTRAMUSCULAR ONCE
Status: COMPLETED | OUTPATIENT
Start: 2023-10-26 | End: 2023-10-26

## 2023-10-26 RX ADMIN — MEDROXYPROGESTERONE ACETATE 150 MG: 150 INJECTION, SUSPENSION INTRAMUSCULAR at 12:19

## 2023-12-06 ENCOUNTER — HOSPITAL ENCOUNTER (EMERGENCY)
Facility: HOSPITAL | Age: 23
Discharge: HOME | End: 2023-12-06
Attending: EMERGENCY MEDICINE
Payer: COMMERCIAL

## 2023-12-06 ENCOUNTER — APPOINTMENT (EMERGENCY)
Dept: RADIOLOGY | Facility: HOSPITAL | Age: 23
End: 2023-12-06
Payer: COMMERCIAL

## 2023-12-06 VITALS
WEIGHT: 190 LBS | SYSTOLIC BLOOD PRESSURE: 145 MMHG | BODY MASS INDEX: 28.79 KG/M2 | DIASTOLIC BLOOD PRESSURE: 90 MMHG | TEMPERATURE: 97.8 F | HEART RATE: 79 BPM | HEIGHT: 68 IN | RESPIRATION RATE: 18 BRPM | OXYGEN SATURATION: 98 %

## 2023-12-06 DIAGNOSIS — J32.9 SINUSITIS: Primary | ICD-10-CM

## 2023-12-06 LAB
FLUAV RNA RESP QL NAA+PROBE: NEGATIVE
FLUBV RNA RESP QL NAA+PROBE: NEGATIVE
RSV RNA RESP QL NAA+PROBE: NEGATIVE
SARS-COV-2 RNA RESP QL NAA+PROBE: NEGATIVE

## 2023-12-06 PROCEDURE — 71045 X-RAY EXAM CHEST 1 VIEW: CPT

## 2023-12-06 PROCEDURE — 99284 EMERGENCY DEPT VISIT MOD MDM: CPT | Performed by: EMERGENCY MEDICINE

## 2023-12-06 PROCEDURE — 99283 EMERGENCY DEPT VISIT LOW MDM: CPT

## 2023-12-06 PROCEDURE — 0241U HB NFCT DS VIR RESP RNA 4 TRGT: CPT | Performed by: EMERGENCY MEDICINE

## 2023-12-06 RX ORDER — AMOXICILLIN AND CLAVULANATE POTASSIUM 875; 125 MG/1; MG/1
1 TABLET, FILM COATED ORAL EVERY 12 HOURS
Qty: 14 TABLET | Refills: 0 | Status: SHIPPED | OUTPATIENT
Start: 2023-12-06 | End: 2023-12-13

## 2023-12-06 RX ORDER — AMOXICILLIN AND CLAVULANATE POTASSIUM 875; 125 MG/1; MG/1
1 TABLET, FILM COATED ORAL ONCE
Status: COMPLETED | OUTPATIENT
Start: 2023-12-06 | End: 2023-12-06

## 2023-12-06 RX ADMIN — DEXAMETHASONE SODIUM PHOSPHATE 10 MG: 10 INJECTION, SOLUTION INTRAMUSCULAR; INTRAVENOUS at 17:19

## 2023-12-06 RX ADMIN — AMOXICILLIN AND CLAVULANATE POTASSIUM 1 TABLET: 875; 125 TABLET, FILM COATED ORAL at 17:48

## 2023-12-06 NOTE — ED PROVIDER NOTES
History  Chief Complaint   Patient presents with    Cold Like Symptoms     Cold symptoms for about a week. CP started last night with deep breaths and it is more prominent on the left side. Coughing up green sputum     Otherwise healthy 25 yo female presenting to the ed for reports of feeling sick for about 1 week now with L sided CP with coughing and coughing up green sputum. Denies fevers, night sweats, chills, abdominal pain, nausea vomiting diarrhea constipation or dysuria and hematuria. Patient does state that she has some left-sided chest pain when she coughs or takes deep breathing. Prior to Admission Medications   Prescriptions Last Dose Informant Patient Reported? Taking? medroxyPROGESTERone (DEPO-PROVERA) 150 mg/mL injection Past Month  No Yes   Sig: Inject 1 mL (150 mg total) into a muscle every 3 (three) months      Facility-Administered Medications: None       Past Medical History:   Diagnosis Date    Allergic     GERD (gastroesophageal reflux disease)        Past Surgical History:   Procedure Laterality Date    CHOLECYSTECTOMY LAPAROSCOPIC N/A 5/24/2023    Procedure: CHOLECYSTECTOMY LAPAROSCOPIC;  Surgeon: Lluvia Cardenas MD;  Location: [facility];  Service: General    WISDOM TOOTH EXTRACTION         Family History   Problem Relation Age of Onset    No Known Problems Mother     No Known Problems Brother      I have reviewed and agree with the history as documented. E-Cigarette/Vaping    E-Cigarette Use Current Every Day User      E-Cigarette/Vaping Substances    Nicotine Yes     THC No     CBD No     Flavoring Yes     Other No     Unknown No      Social History     Tobacco Use    Smoking status: Never    Smokeless tobacco: Never   Vaping Use    Vaping Use: Every day    Substances: Nicotine, Flavoring   Substance Use Topics    Alcohol use: Yes     Comment: socially    Drug use: Never       Review of Systems   HENT:  Positive for congestion, postnasal drip and sore throat.     Respiratory: Positive for cough. All other systems reviewed and are negative. Physical Exam  Physical Exam  Vitals and nursing note reviewed. Constitutional:       General: She is not in acute distress. Appearance: She is well-developed. She is not diaphoretic. HENT:      Head: Normocephalic and atraumatic. Right Ear: External ear normal.      Left Ear: External ear normal.      Nose: Congestion present. No rhinorrhea. Mouth/Throat:      Mouth: Mucous membranes are moist.      Pharynx: Oropharynx is clear. No oropharyngeal exudate or posterior oropharyngeal erythema. Eyes:      General: No scleral icterus. Right eye: No discharge. Left eye: No discharge. Conjunctiva/sclera: Conjunctivae normal.      Pupils: Pupils are equal, round, and reactive to light. Neck:      Vascular: No JVD. Trachea: No tracheal deviation. Cardiovascular:      Rate and Rhythm: Normal rate and regular rhythm. Heart sounds: Normal heart sounds. No murmur heard. No friction rub. No gallop. Pulmonary:      Effort: Pulmonary effort is normal. No respiratory distress. Breath sounds: Normal breath sounds. No stridor. No wheezing or rales. Abdominal:      General: Bowel sounds are normal. There is no distension. Palpations: Abdomen is soft. There is no mass. Tenderness: There is no abdominal tenderness. There is no guarding. Musculoskeletal:         General: No tenderness or deformity. Normal range of motion. Cervical back: Normal range of motion and neck supple. Skin:     General: Skin is warm and dry. Coloration: Skin is not pale. Findings: No erythema or rash. Neurological:      Mental Status: She is alert and oriented to person, place, and time. Cranial Nerves: No cranial nerve deficit. Sensory: No sensory deficit. Motor: No abnormal muscle tone. Psychiatric:         Behavior: Behavior normal.         Thought Content:  Thought content normal.         Judgment: Judgment normal.         Vital Signs  ED Triage Vitals [12/06/23 1615]   Temperature Pulse Respirations Blood Pressure SpO2   97.8 °F (36.6 °C) 79 18 145/90 99 %      Temp Source Heart Rate Source Patient Position - Orthostatic VS BP Location FiO2 (%)   Temporal Monitor Sitting Left arm --      Pain Score       7           Vitals:    12/06/23 1615   BP: 145/90   Pulse: 79   Patient Position - Orthostatic VS: Sitting         Visual Acuity      ED Medications  Medications   amoxicillin-clavulanate (AUGMENTIN) 875-125 mg per tablet 1 tablet (has no administration in time range)   dexamethasone oral liquid 10 mg 1 mL (10 mg Oral Given 12/6/23 1719)       Diagnostic Studies  Results Reviewed       Procedure Component Value Units Date/Time    FLU/RSV/COVID - if FLU/RSV clinically relevant [[id number]]  (Normal) Collected: 12/06/23 1635    Lab Status: Final result Specimen: Nares from Nose Updated: 12/06/23 1721     SARS-CoV-2 Negative     INFLUENZA A PCR Negative     INFLUENZA B PCR Negative     RSV PCR Negative    Narrative:      FOR PEDIATRIC PATIENTS - copy/paste COVID Guidelines URL to browser: [email or web address]    SARS-CoV-2 assay is a Nucleic Acid Amplification assay intended for the  qualitative detection of nucleic acid from SARS-CoV-2 in nasopharyngeal  swabs. Results are for the presumptive identification of SARS-CoV-2 RNA. Positive results are indicative of infection with SARS-CoV-2, the virus  causing COVID-19, but do not rule out bacterial infection or co-infection  with other viruses. Laboratories within the [facility] and its  territories are required to report all positive results to the appropriate  public health authorities. Negative results do not preclude SARS-CoV-2  infection and should not be used as the sole basis for treatment or other  patient management decisions.  Negative results must be combined with  clinical observations, patient history, and epidemiological information. This test has not been FDA cleared or approved. This test has been authorized by FDA under an Emergency Use Authorization  (EUA). This test is only authorized for the duration of time the  declaration that circumstances exist justifying the authorization of the  emergency use of an in vitro diagnostic tests for detection of SARS-CoV-2  virus and/or diagnosis of COVID-19 infection under section 564(b)(1) of  the Act, 21 U. S.C. 909CUH-6(V)(6), unless the authorization is terminated  or revoked sooner. The test has been validated but independent review by FDA  and CLIA is pending. Test performed using [employer] GeneXpert: This RT-PCR assay targets N2,  a region unique to SARS-CoV-2. A conserved region in the E-gene was chosen  for pan-Sarbecovirus detection which includes SARS-CoV-2. According to CMS-2020-01-R, this platform meets the definition of high-throughput technology. XR chest 1 view portable   ED Interpretation by Sam Mosqueda DO (12/06 1719)   No acute cardio/pulmonary disease noted on my interpretation                     Procedures  Procedures         ED Course                       PERC Rule for PE      Flowsheet Row Most Recent Value   PERC Rule for PE    Age >=50 0 Filed at: 12/06/2023 1629   HR >=100 0 Filed at: 12/06/2023 1629   O2 Sat on room air < 95% 0 Filed at: 12/06/2023 1629   History of PE or DVT 0 Filed at: 12/06/2023 1629   Recent trauma or surgery 0 Filed at: 12/06/2023 1629   Hemoptysis 0 Filed at: 12/06/2023 1629   Exogenous estrogen 0 Filed at: 12/06/2023 1629   Unilateral leg swelling 0 Filed at: 12/06/2023 1629   PERC Rule for PE Results 0 Filed at: 12/06/2023 1629                SBIRT 22yo+      Flowsheet Row Most Recent Value   Initial Alcohol Screen: US AUDIT-C     1. How often do you have a drink containing alcohol? 0 Filed at: 12/06/2023 1619   2.  How many drinks containing alcohol do you have on a typical day you are drinking? 0 Filed at: 12/06/2023 1619   3a. Male UNDER 65: How often do you have five or more drinks on one occasion? 0 Filed at: 12/06/2023 1619   3b. FEMALE Any Age, or MALE 65+: How often do you have 4 or more drinks on one occassion? 0 Filed at: 12/06/2023 1619   Audit-C Score 0 Filed at: 12/06/2023 1619   SERGIO: How many times in the past year have you. .. Used an illegal drug or used a prescription medication for non-medical reasons? Never Filed at: 12/06/2023 1619                      Medical Decision Making  60-year-old female with approximately 7 days of feeling unwell with congestion, coughing. Sputum now green. Will get chest x-ray, COVID/flu/RSV swab. Give one-time dose of Decadron for sore throat and coughing. Will treat with antibiotics for likely bacterial sinus infection. Viral swab negative for COVID flu and RSV. Chest x-ray showing no obvious signs of pneumonia as patient's been having symptoms for at least 7 days will treat as bacterial sinusitis at this time with Augmentin. Amount and/or Complexity of Data Reviewed  Radiology: ordered. Disposition  Final diagnoses:   Sinusitis     Time reflects when diagnosis was documented in both MDM as applicable and the Disposition within this note       Time User Action Codes Description Comment    12/6/2023  5:34  N [facility] [J32.9] Sinusitis           ED Disposition       ED Disposition   Discharge    Condition   Stable    Date/Time   Wed Dec 6, 2023 [address] discharge to home/self care.                    Follow-up Information       Follow up With Specialties Details Why Contact Info Additional Information    Your Primary Care Doctor  Go to        [facility] Emergency Department Emergency Medicine Go to  As needed, If symptoms worsen [address] [place] [phone]  [address] [place] [address] Emergency Department, [address] [facility], [address] [facility]            Patient's Medications   Discharge Prescriptions    AMOXICILLIN-CLAVULANATE (AUGMENTIN) 875-125 MG PER TABLET    Take 1 tablet by mouth every 12 (twelve) hours for 7 days       Start Date: 12/6/2023 End Date: 12/13/2023       Order Dose: 1 tablet       Quantity: 14 tablet    Refills: 0       No discharge procedures on file.     PDMP Review       None            ED Provider  Electronically Signed by             Zoraida Godfrey DO  12/06/23 1529

## 2023-12-19 ENCOUNTER — VBI (OUTPATIENT)
Dept: ADMINISTRATIVE | Facility: OTHER | Age: 23
End: 2023-12-19

## 2024-01-08 ENCOUNTER — TELEPHONE (OUTPATIENT)
Dept: FAMILY MEDICINE CLINIC | Facility: HOME HEALTHCARE | Age: 24
End: 2024-01-08

## 2024-01-08 NOTE — TELEPHONE ENCOUNTER
Called back patient. Patient was inquiring about her next Depo-provera, contraception injection and her next dental appointment. Information provided to patient. Patient acknowledged and verbalized understanding.

## 2024-01-17 ENCOUNTER — OFFICE VISIT (OUTPATIENT)
Age: 24
End: 2024-01-17
Payer: COMMERCIAL

## 2024-01-17 VITALS — HEIGHT: 68 IN | TEMPERATURE: 98.5 F | WEIGHT: 197 LBS | BODY MASS INDEX: 29.86 KG/M2

## 2024-01-17 DIAGNOSIS — Z13.89 SCREENING FOR SKIN CONDITION: Primary | ICD-10-CM

## 2024-01-17 DIAGNOSIS — L30.1 DYSHIDROTIC ECZEMA: ICD-10-CM

## 2024-01-17 DIAGNOSIS — L70.0 ACNE VULGARIS: ICD-10-CM

## 2024-01-17 PROCEDURE — 99204 OFFICE O/P NEW MOD 45 MIN: CPT | Performed by: DERMATOLOGY

## 2024-01-17 RX ORDER — TRIAMCINOLONE ACETONIDE 1 MG/G
CREAM TOPICAL 2 TIMES DAILY
Qty: 30 G | Refills: 2 | Status: SHIPPED | OUTPATIENT
Start: 2024-01-17

## 2024-01-17 NOTE — PATIENT INSTRUCTIONS
"1. DYSHIDROTIC ECZEMA      Assessment and Plan:  Based on a thorough discussion of this condition and the management approach to it (including a comprehensive discussion of the known risks, side effects and potential benefits of treatment), the patient (family) agrees to implement the following specific plan:  Triamcinolone 0.1% cream twice a day  Follow up if no improvement noted.     What is dyshidrotic eczema?         Dyshidrotic eczema is a type of eczema that affects mostly the hands and sometimes the feet.  It causes small, itchy blisters along the sides of the fingers.  .  It can be brought on by using strong soaps or , from excess contact with water, as an allergic reaction to something getting on the hands, as an allergic reaction to a fungal infection elsewhere (usually the feet) or from stress   Flares are treated with strong topical steroids.  Patch testing can be done to rule out an allergic reaction.  Using gloves and moisturizers to avoid irritation of the skin are important in preventing flares.         ACNE VULGARIS       TODAY'S PLAN:     PRESCRIPTION MANAGEMENT:  Several treatment options were discussed including topical retinoids and their side effects.     Skin Hygiene:      Wash affected areas (face, chest, and back) TWICE A DAY with a mild cleanser such as Dove for sensitive skin.  Use only mild cleansers (hypoallergenic and without fragrances) and fragrance free detergent (not \"unscented\" products which contain a masking agent); we discussed avoiding irritants/fragranced products.  Apply a good oil-free facial moisturizer AT LEAST TWO TIMES A DAY \" such as Cerave or cetaphil.  Minimize the application of oils and cosmetics to the affected skin.  This includes HAIR PRODUCTS such as \"leave in\" conditioners.  Unless the product specifically states that it \"won't cause acne,\" \"won't clog pores,\" and/or \"is non-comedogenic\" then it may actually CAUSE acne.  If you smoke, STOP. Nicotine " "increases sebum retention and increased scale within the follicles, forming comedones (blackheads and whiteheads).  Abrasive treatments such as dermabrasion and spa facials may aggravate inflammatory acne.  Do NOT scratch or pick your acne bumps.  The evidence that diet directly affects acne remains weak.  However, diet does affect your overall health.  Eat plenty of fresh fruit and vegetables.  Avoid protein or amino acid supplements, particularly if they contain leucine. Consider a low-glycemic, low-protein and low-dairy diet.  Be mindful that certain medications may cause of aggravate acne.  Make sure to tell your Dermatologist if you start a new prescription, nutritional supplement, and/or herbal remedy.      MORNING Topical Regimen:      Adapalene 0.1% gel (available \"over-the-counter;\" usually under $35) AT LEAST 1 HOUR BEFORE BEDTIME:  Evenly spread a SINGLE pea-sized amount of this medication over your entire face, avoiding the eyes and corners of the mouth.      EVENING Topical Regimen:      NONE.      SYSTEMIC Strategies:      NONE        MEDICAL DECISION MAKING  Treatment Goal:  Resolution of the CHRONIC condition.       Chronic condition is NOT at treatment goal.  It is progressing along its expected course OR is poorly-controlled.          "

## 2024-01-17 NOTE — PROGRESS NOTES
"[facility] Note     Patient Name: Ladi Lopez  Encounter Date: 01/17/2024     Have you been cared for by a [facility] Dermatologist in the last 3 years and, if so, which description applies to you?    NO.   I am considered a \"new\" patient and must complete all patient intake questions. I am FEMALE/of child-bearing potential.    REVIEW OF SYSTEMS:  Have you recently had or currently have any of the following? Recent fever or chills? No  Any non-healing wound? No  Are you pregnant or planning to become pregnant? No  Are you currently or planning to be nursing or breast feeding? No   PAST MEDICAL HISTORY:  Have you personally ever had or currently have any of the following?  If \"YES,\" then please provide more detail. Skin cancer (such as Melanoma, Basal Cell Carcinoma, Squamous Cell Carcinoma?  No  Tuberculosis, HIV/AIDS, Hepatitis B or C: No  Radiation Treatment No   HISTORY OF IMMUNOSUPPRESSION:   Do you have a history of any of the following:  Systemic Immunosuppression such as Diabetes, Biologic or Immunotherapy, Chemotherapy, Organ Transplantation, Bone Marrow Transplantation?  No    Answering \"YES\" requires the addition of the dotphrase \"IMMUNOSUPPRESSED\" as the first diagnosis of the patient's visit.   FAMILY HISTORY:  Any \"first degree relatives\" (parent, brother, sister, or child) with the following?    Skin Cancer, Pancreatic or Other Cancer? No   PATIENT EXPERIENCE:    Do you want the Dermatologist to perform a COMPLETE skin exam today including a clinical examination under the \"bra and underwear\" areas?  NO  If necessary, do we have your permission to call and leave a detailed message on your Preferred Phone number that includes your specific medical information?  Yes      No Known Allergies   Current Outpatient Medications:     medroxyPROGESTERone (DEPO-PROVERA) 150 mg/mL injection, Inject 1 mL (150 mg total) into a muscle every 3 (three) months, Disp: 1 mL, Rfl: 3          Whom besides " the patient is providing clinical information about today's encounter?   NO ADDITIONAL HISTORIAN (patient alone provided history)      23 year old female-new patient presents with an itchy rash in her feet and hands. Patient also complains acne, has used Tretinoin cream  in the past.     Physical Exam and Assessment/Plan by Diagnosis:    1. DYSHIDROTIC ECZEMA      Physical Exam:  (Anatomic Location); (Size and Morphological Description); (Differential Diagnosis):  Small, tense, clear vesicles on sides of the fingers and feet    Additional History of Present Condition:  Present for the past months.     Assessment and Plan:  Based on a thorough discussion of this condition and the management approach to it (including a comprehensive discussion of the known risks, side effects and potential benefits of treatment), the patient (family) agrees to implement the following specific plan:  Triamcinolone 0.1% cream twice a day  Follow up if no improvement noted.     What is dyshidrotic eczema?         Dyshidrotic eczema is a type of eczema that affects mostly the hands and sometimes the feet.  It causes small, itchy blisters along the sides of the fingers.  .  It can be brought on by using strong soaps or , from excess contact with water, as an allergic reaction to something getting on the hands, as an allergic reaction to a fungal infection elsewhere (usually the feet) or from stress   Flares are treated with strong topical steroids.  Patch testing can be done to rule out an allergic reaction.  Using gloves and moisturizers to avoid irritation of the skin are important in preventing flares.         ACNE VULGARIS    Physical Exam:  Anatomic Locations Involved: Face  Global Assessment: ALMOST CLEAR: A few scattered comedones and a few small inflammatory papules.   Scarring Present? NONE  Pertinent Positives:  Pertinent Negatives:    Additional History of Present Condition:  patient with history of depo provera  "injection       TODAY'S PLAN:     PRESCRIPTION MANAGEMENT:  Several treatment options were discussed including topical retinoids and their side effects.     Skin Hygiene:      Wash affected areas (face, chest, and back) TWICE A DAY with a mild cleanser such as Dove for sensitive skin.  Use only mild cleansers (hypoallergenic and without fragrances) and fragrance free detergent (not \"unscented\" products which contain a masking agent); we discussed avoiding irritants/fragranced products.  Apply a good oil-free facial moisturizer AT LEAST TWO TIMES A DAY \" such as Cerave or cetaphil.  Minimize the application of oils and cosmetics to the affected skin.  This includes HAIR PRODUCTS such as \"leave in\" conditioners.  Unless the product specifically states that it \"won't cause acne,\" \"won't clog pores,\" and/or \"is non-comedogenic\" then it may actually CAUSE acne.  If you smoke, STOP. Nicotine increases sebum retention and increased scale within the follicles, forming comedones (blackheads and whiteheads).  Abrasive treatments such as dermabrasion and spa facials may aggravate inflammatory acne.  Do NOT scratch or pick your acne bumps.  The evidence that diet directly affects acne remains weak.  However, diet does affect your overall health.  Eat plenty of fresh fruit and vegetables.  Avoid protein or amino acid supplements, particularly if they contain leucine. Consider a low-glycemic, low-protein and low-dairy diet.  Be mindful that certain medications may cause of aggravate acne.  Make sure to tell your Dermatologist if you start a new prescription, nutritional supplement, and/or herbal remedy.      MORNING Topical Regimen:      Adapalene 0.1% gel (available \"over-the-counter;\" usually under $35) AT LEAST 1 HOUR BEFORE BEDTIME:  Evenly spread a SINGLE pea-sized amount of this medication over your entire face, avoiding the eyes and corners of the mouth.      EVENING Topical Regimen:      NONE.      SYSTEMIC Strategies:  "     NONE        MEDICAL DECISION MAKING  Treatment Goal:  Resolution of the CHRONIC condition.       Chronic condition is NOT at treatment goal.  It is progressing along its expected course OR is poorly-controlled.            Scribe Attestation      I,:  Azeb Gonzalez am acting as a scribe while in the presence of the attending physician.:       I,:  Earnest Damon MD personally performed the services described in this documentation    as scribed in my presence.:

## 2024-01-25 DIAGNOSIS — Z30.013 ENCOUNTER FOR INITIAL PRESCRIPTION OF INJECTABLE CONTRACEPTIVE: ICD-10-CM

## 2024-01-25 RX ORDER — MEDROXYPROGESTERONE ACETATE 150 MG/ML
150 INJECTION, SUSPENSION INTRAMUSCULAR
Qty: 1 ML | Refills: 3 | Status: SHIPPED | OUTPATIENT
Start: 2024-01-25

## 2024-01-25 RX ORDER — MEDROXYPROGESTERONE ACETATE 150 MG/ML
150 INJECTION, SUSPENSION INTRAMUSCULAR
Qty: 1 ML | Refills: 3 | Status: CANCELLED | OUTPATIENT
Start: 2024-01-25

## 2024-01-26 ENCOUNTER — DOCUMENTATION (OUTPATIENT)
Dept: CCU | Facility: HOSPITAL | Age: 24
End: 2024-01-26

## 2024-01-26 ENCOUNTER — CLINICAL SUPPORT (OUTPATIENT)
Dept: FAMILY MEDICINE CLINIC | Facility: CLINIC | Age: 24
End: 2024-01-26
Payer: COMMERCIAL

## 2024-01-26 DIAGNOSIS — Z30.42 ENCOUNTER FOR DEPO-PROVERA CONTRACEPTION: Primary | ICD-10-CM

## 2024-01-26 DIAGNOSIS — Z30.9 ENCOUNTER FOR CONTRACEPTIVE MANAGEMENT, UNSPECIFIED TYPE: Primary | ICD-10-CM

## 2024-01-26 PROCEDURE — T1015 CLINIC SERVICE: HCPCS | Performed by: FAMILY MEDICINE

## 2024-01-26 RX ORDER — MEDROXYPROGESTERONE ACETATE 150 MG/ML
150 INJECTION, SUSPENSION INTRAMUSCULAR ONCE
Status: COMPLETED | OUTPATIENT
Start: 2024-01-26 | End: 2024-01-26

## 2024-01-26 RX ADMIN — MEDROXYPROGESTERONE ACETATE 150 MG: 150 INJECTION, SUSPENSION INTRAMUSCULAR at 11:42

## 2024-01-26 NOTE — PROGRESS NOTES
Patient stated she had a generalized rash after last time administration. Patient has history of Eczema and is not sure if it was the Depo-provera injection that caused her rash. Asked doctor for further recommendations prior to administration.  As per Dr. Zaman, some options were provided to patient and patient decided to get the depo-provera injection today and was made aware to take any OTC anti-allergy meds such as Benadryl in case of any reaction. Patient acknowledged and verbalized understanding. Also virtual visit was scheduled for patient next week to discuss about this matter.

## 2024-01-29 ENCOUNTER — OFFICE VISIT (OUTPATIENT)
Dept: DENTISTRY | Facility: CLINIC | Age: 24
End: 2024-01-29
Payer: COMMERCIAL

## 2024-01-29 VITALS — SYSTOLIC BLOOD PRESSURE: 133 MMHG | HEART RATE: 99 BPM | DIASTOLIC BLOOD PRESSURE: 93 MMHG

## 2024-01-29 DIAGNOSIS — K03.6 ACCRETIONS ON TEETH: Primary | ICD-10-CM

## 2024-01-29 PROCEDURE — D0274 BITEWINGS - 4 RADIOGRAPHIC IMAGES: HCPCS | Performed by: DENTAL HYGIENIST

## 2024-01-29 PROCEDURE — D1110 PROPHYLAXIS - ADULT: HCPCS | Performed by: DENTAL HYGIENIST

## 2024-01-29 PROCEDURE — D1330 ORAL HYGIENE INSTRUCTIONS: HCPCS | Performed by: DENTAL HYGIENIST

## 2024-01-29 PROCEDURE — D0190 SCREENING OF A PATIENT: HCPCS | Performed by: DENTAL HYGIENIST

## 2024-01-29 NOTE — PROGRESS NOTES
Adult Prophy  Chief Complaint   Patient presents with    Routine Oral Cleaning    Patient leaves for [employer] in April    Method Used:  Prophy Method Used: Ultrasonic Scaling on LA  Hand Scaling  Polished  Flossed  Fluoride    Radiographs Taken in [facility]is: (Taken to assess periodontal health)  Bitewings x4    Intra/Extra Oral Cancer Screening:  Within normal limits    Oral Hygiene:  Fair    Plaque:  Light    Calculus:  Moderate  Lower anteriors    Bleeding:  Light  Moderate    Stain:  Light    Periodontal Charting:   Spot probing    Periodontal Classification:  Generalized  Mild  Moderate  Gingivitis    Nutritional Counseling:  Discussed dietary habits and suggested better food choices  Discussed pH and the role it plays in decay    Oral Hygiene Instruction:    Went over daily routine and c-shaped flossing.   Discussed Oral systemic link and role of plaque in gum disease.    No orders of the defined types were placed in this encounter.       Next Visit:  6 Month prophy  Dentist visit- we have on a CX list for April

## 2024-01-31 ENCOUNTER — TELEMEDICINE (OUTPATIENT)
Dept: FAMILY MEDICINE CLINIC | Facility: CLINIC | Age: 24
End: 2024-01-31
Payer: COMMERCIAL

## 2024-01-31 DIAGNOSIS — L30.1 DYSHIDROTIC ECZEMA: ICD-10-CM

## 2024-01-31 DIAGNOSIS — Z30.42 ON DEPO-PROVERA FOR CONTRACEPTION: Primary | ICD-10-CM

## 2024-01-31 PROCEDURE — 99213 OFFICE O/P EST LOW 20 MIN: CPT

## 2024-01-31 RX ORDER — TRIAMCINOLONE ACETONIDE 1 MG/G
CREAM TOPICAL 2 TIMES DAILY
Qty: 30 G | Refills: 2 | Status: SHIPPED | OUTPATIENT
Start: 2024-01-31

## 2024-01-31 NOTE — PROGRESS NOTES
Virtual Regular Visit    Verification of patient location:    Patient is located at Home in the following state in which I hold an active license [place]      Assessment/Plan:    Problem List Items Addressed This Visit       On Depo-Provera for contraception - Primary     Received second inj 1/26  Shortly after first inj, intermittent hives  Have not stopped   Will consider Nexplanon as alternative before next inj          Dyshidrotic eczema     3mo h/o intermittent hives of legs  3-4in circular itchy erythematous several at once, self-resolving  Per derm, may be eczema, prescribed steroid cream, relieves itch  Timing lines up with starting Depo-provera  Will refer to Allergist         Relevant Medications    triamcinolone (KENALOG) 0.1 % cream    Coal Tar Extract 1 % LOTN    Other Relevant Orders    Ambulatory Referral to Allergy            Reason for visit is   Chief Complaint   Patient presents with    Virtual Regular Visit          Encounter provider Amanda Zaman MD    Provider located at [address] [place] [address]      Recent Visits  Date Type Provider Dept   01/31/24 Telemedicine Amanda Zaman MD [facility]   Showing recent visits within past 7 days and meeting all other requirements  Future Appointments  No visits were found meeting these conditions.  Showing future appointments within next 150 days and meeting all other requirements       The patient was identified by name and date of birth. Ladi Lopez was informed that this is a telemedicine visit and that the visit is being conducted through the [employer] platform. She agrees to proceed..  My office door was closed. No one else was in the room.  She acknowledged consent and understanding of privacy and security of the video platform. The patient has agreed to participate and understands they can discontinue the visit at any time.    Patient is  aware this is a billable service.     Subjective  Ladi Lopez is a 23 y.o. female seen via telemed concerning a rash.      Ms Lopez is a 23F w no significant PMH, being seen by telemed concerning skin rash. Patient reports a 3mo h/o  intermittent hives of legs, described as 3-4in circular itchy erythematous several at once, self-resolving. Patient first tried changing soaps, lotions, detergents, with no change in symptoms. Has been eating fewer processed foods, cooking more at home. Was seen by derm, who believe it to be eczema, and prescribed steroid cream for acute relief. Timing of the start of the rash lines up with the first injection of Depo-Provera that the patient received. We believe this may be an unlikely trigger or allergen, but if workup does not reveal another source, patient is considering switching to Nexplanon for birth control. Of note, no h/o allergies, asthma. FH of extensive allergies. Patient also reports runny nose, cough after every meal. We will refer the patient to an Allergist for further evaluation, and follow up after that visit.          Past Medical History:   Diagnosis Date    Allergic     GERD (gastroesophageal reflux disease)        Past Surgical History:   Procedure Laterality Date    CHOLECYSTECTOMY LAPAROSCOPIC N/A 5/24/2023    Procedure: CHOLECYSTECTOMY LAPAROSCOPIC;  Surgeon: Karen Yeh MD;  Location: [facility];  Service: General    WISDOM TOOTH EXTRACTION         Current Outpatient Medications   Medication Sig Dispense Refill    Coal Tar Extract 1 % LOTN Apply topically 2 (two) times a day 120 mL 2    triamcinolone (KENALOG) 0.1 % cream Apply topically 2 (two) times a day 30 g 2    medroxyPROGESTERone (DEPO-PROVERA) 150 mg/mL injection Inject 1 mL (150 mg total) into a muscle every 3 (three) months 1 mL 3     No current facility-administered medications for this visit.        No Known Allergies    Review of Systems   Constitutional:  Negative for activity change,  chills, fatigue and fever.   HENT:  Positive for rhinorrhea. Negative for congestion, ear pain and sore throat.    Eyes:  Negative for pain, discharge, redness, itching and visual disturbance.   Respiratory:  Positive for cough. Negative for shortness of breath and wheezing.    Cardiovascular:  Negative for chest pain and palpitations.   Gastrointestinal:  Negative for abdominal pain, constipation, diarrhea, nausea and vomiting.   Genitourinary:  Negative for dysuria and hematuria.   Musculoskeletal:  Negative for arthralgias and back pain.   Skin:  Positive for rash. Negative for color change.        3-4in circular itchy erythematous several at once, self-resolving   Neurological:  Negative for dizziness, seizures, syncope, light-headedness and headaches.   Psychiatric/Behavioral:  Negative for agitation, confusion and sleep disturbance. The patient is not nervous/anxious.    All other systems reviewed and are negative.      Video Exam    There were no vitals filed for this visit.    Physical Exam  Constitutional:       General: She is not in acute distress.     Appearance: Normal appearance. She is not ill-appearing.   HENT:      Head: Normocephalic and atraumatic.      Right Ear: External ear normal.      Left Ear: External ear normal.      Nose: Nose normal.      Mouth/Throat:      Pharynx: Oropharynx is clear.   Eyes:      Extraocular Movements: Extraocular movements intact.   Pulmonary:      Effort: Pulmonary effort is normal.   Musculoskeletal:      Cervical back: Normal range of motion.   Neurological:      Mental Status: She is alert and oriented to person, place, and time.   Psychiatric:         Mood and Affect: Mood normal.         Behavior: Behavior normal.          Visit Time  Total Visit Duration: 31min

## 2024-02-01 PROBLEM — L30.1 DYSHIDROTIC ECZEMA: Status: ACTIVE | Noted: 2024-02-01

## 2024-02-01 NOTE — ASSESSMENT & PLAN NOTE
Received second inj 1/26  Shortly after first inj, intermittent hives  Have not stopped   Will consider Nexplanon as alternative before next inj

## 2024-02-01 NOTE — ASSESSMENT & PLAN NOTE
3mo h/o intermittent hives of legs  3-4in circular itchy erythematous several at once, self-resolving  Per derm, may be eczema, prescribed steroid cream, relieves itch  Timing lines up with starting Depo-provera  Will refer to Allergist

## 2024-02-02 ENCOUNTER — OFFICE VISIT (OUTPATIENT)
Dept: FAMILY MEDICINE CLINIC | Facility: CLINIC | Age: 24
End: 2024-02-02
Payer: COMMERCIAL

## 2024-02-02 VITALS
HEART RATE: 87 BPM | TEMPERATURE: 98.5 F | SYSTOLIC BLOOD PRESSURE: 126 MMHG | HEIGHT: 68 IN | OXYGEN SATURATION: 98 % | WEIGHT: 200 LBS | BODY MASS INDEX: 30.31 KG/M2 | DIASTOLIC BLOOD PRESSURE: 80 MMHG

## 2024-02-02 DIAGNOSIS — Z13.228 SCREENING FOR METABOLIC DISORDER: ICD-10-CM

## 2024-02-02 DIAGNOSIS — Z13.29 THYROID DISORDER SCREEN: ICD-10-CM

## 2024-02-02 DIAGNOSIS — Z13.1 DIABETES MELLITUS SCREENING: ICD-10-CM

## 2024-02-02 DIAGNOSIS — Z13.220 SCREENING CHOLESTEROL LEVEL: ICD-10-CM

## 2024-02-02 DIAGNOSIS — E55.9 VITAMIN D DEFICIENCY: ICD-10-CM

## 2024-02-02 DIAGNOSIS — L50.9 HIVES: ICD-10-CM

## 2024-02-02 DIAGNOSIS — L30.1 DYSHIDROTIC ECZEMA: ICD-10-CM

## 2024-02-02 DIAGNOSIS — Z00.00 ANNUAL PHYSICAL EXAM: Primary | ICD-10-CM

## 2024-02-02 DIAGNOSIS — Z23 NEED FOR VACCINATION: ICD-10-CM

## 2024-02-02 PROCEDURE — T1015 CLINIC SERVICE: HCPCS | Performed by: FAMILY MEDICINE

## 2024-02-02 PROCEDURE — 90651 9VHPV VACCINE 2/3 DOSE IM: CPT | Performed by: FAMILY MEDICINE

## 2024-02-02 NOTE — PROGRESS NOTES
ADULT ANNUAL PHYSICAL  [facility] [place]    NAME: Ladi Lopez  AGE: 23 y.o. SEX: female  : 2000     DATE: 2024     Assessment and Plan:     Problem List Items Addressed This Visit       Dyshidrotic eczema    Relevant Orders    Ambulatory Referral to Allergy     Other Visit Diagnoses       Annual physical exam    -  Primary    Hives        Reported s/p depo injections  None present today  Cont allergist f/u  Labs as ordered    Relevant Orders    CBC and differential    [place] Allergy Panel, Adult    Ambulatory Referral to Allergy    Screening for metabolic disorder        Relevant Orders    Comprehensive metabolic panel    Diabetes mellitus screening        Relevant Orders    Hemoglobin A1C    Vitamin D deficiency        Relevant Orders    Vitamin D 25 hydroxy    Thyroid disorder screen        Relevant Orders    TSH, 3rd generation with Free T4 reflex    Screening cholesterol level        Relevant Orders    Lipid Panel with Direct LDL reflex    Need for vaccination        Relevant Orders    HPV VACCINE 9 VALENT IM (Completed)              Immunizations and preventive care screenings were discussed with patient today. Appropriate education was printed on patient's after visit summary.    Counseling:  Alcohol/drug use: discussed moderation in alcohol intake, the recommendations for healthy alcohol use, and avoidance of illicit drug use.  Dental Health: discussed importance of regular tooth brushing, flossing, and dental visits.  Injury prevention: discussed safety/seat belts, safety helmets, smoke detectors, carbon dioxide detectors, and smoking near bedding or upholstery.  Sexual health: discussed sexually transmitted diseases, partner selection, use of condoms, avoidance of unintended pregnancy, and contraceptive alternatives.  Exercise: the importance of regular exercise/physical activity was discussed. Recommend exercise 3-5 times per week  for at least 30 minutes.          Return in about 4 weeks (around 3/1/2024) for Recheck Pap - [facility] at [facility].     Chief Complaint:     Chief Complaint   Patient presents with    Annual Exam      History of Present Illness:     Adult Annual Physical   Patient here for a comprehensive physical exam. The patient reports problems - hives after depo injections . Patient denies any dyspnea or facial swelling. Endorses hives improved more as it got closer to her next depo injection. Will order labs for further eval. Provided provider name and address for pt to contact from allergist office today.  Paperwork also completed for patient for [employer] evaluation.    Diet and Physical Activity  Diet/Nutrition: well balanced diet, limited junk food, and consuming 3-5 servings of fruits/vegetables daily.   Exercise: walking, 3-4 times a week on average, and 30-60 minutes on average.      Depression Screening  PHQ-2/9 Depression Screening    Little interest or pleasure in doing things: 0 - not at all  Feeling down, depressed, or hopeless: 0 - not at all       General Health  Sleep: sleeps well and gets 7-8 hours of sleep on average.   Hearing: normal - bilateral.  Vision: no vision problems, most recent eye exam <1 year ago, wears glasses, and wears contacts.   Dental: regular dental visits, brushes teeth twice daily, and flosses teeth occasionally.       /GYN Health  Follows with gynecology? yes   Last menstrual period: 1/12/2024  Contraceptive method: injectable contraception.  History of STDs?: no.     Advanced Care Planning  Do you have an advanced directive? no  Do you have a durable medical power of ? no     Review of Systems:     Review of Systems   Constitutional:  Negative for activity change, appetite change and fatigue.   HENT:  Negative for congestion, dental problem, hearing loss, rhinorrhea, sore throat and trouble swallowing.    Eyes:  Negative for photophobia and pain.   Respiratory:  Negative for  "cough, shortness of breath and wheezing.    Cardiovascular:  Negative for chest pain, palpitations and leg swelling.   Gastrointestinal:  Negative for abdominal pain, blood in stool, constipation, diarrhea and nausea.   Endocrine: Negative for polydipsia, polyphagia and polyuria.   Genitourinary:  Negative for difficulty urinating, dysuria, hematuria, menstrual problem, pelvic pain, vaginal bleeding and vaginal discharge.   Musculoskeletal:  Negative for arthralgias and myalgias.   Skin:  Positive for rash (\"random hives; not present today\"). Negative for color change.   Neurological:  Negative for dizziness, weakness and headaches.   Psychiatric/Behavioral:  Negative for self-injury, sleep disturbance and suicidal ideas. The patient is not nervous/anxious.       Past Medical History:     Past Medical History:   Diagnosis Date    Allergic     GERD (gastroesophageal reflux disease)       Past Surgical History:     Past Surgical History:   Procedure Laterality Date    CHOLECYSTECTOMY LAPAROSCOPIC N/A 5/24/2023    Procedure: CHOLECYSTECTOMY LAPAROSCOPIC;  Surgeon: Karen Yeh MD;  Location: [facility];  Service: General    WISDOM TOOTH EXTRACTION        Social History:     Social History     Socioeconomic History    Marital status: Single     Spouse name: None    Number of children: None    Years of education: None    Highest education level: None   Occupational History    None   Tobacco Use    Smoking status: Never    Smokeless tobacco: Never   Vaping Use    Vaping status: Every Day    Substances: Nicotine, Flavoring   Substance and Sexual Activity    Alcohol use: Yes     Comment: socially    Drug use: Never    Sexual activity: Yes     Partners: Male     Birth control/protection: OCP   Other Topics Concern    None   Social History Narrative    None     Social Determinants of Health     Financial Resource Strain: Not on file   Food Insecurity: Not on file   Transportation Needs: Not on file   Physical Activity: " "Not on file   Stress: Not on file   Social Connections: Not on file   Intimate Partner Violence: Not on file   Housing Stability: Not on file      Family History:     Family History   Problem Relation Age of Onset    No Known Problems Mother     No Known Problems Brother       Current Medications:     Current Outpatient Medications   Medication Sig Dispense Refill    Coal Tar Extract 1 % LOTN Apply topically 2 (two) times a day 120 mL 2    medroxyPROGESTERone (DEPO-PROVERA) 150 mg/mL injection Inject 1 mL (150 mg total) into a muscle every 3 (three) months 1 mL 3    triamcinolone (KENALOG) 0.1 % cream Apply topically 2 (two) times a day 30 g 2     No current facility-administered medications for this visit.      Allergies:     No Known Allergies   Physical Exam:     /80   Pulse 87   Temp 98.5 °F (36.9 °C)   Ht 5' 8\" (1.727 m)   Wt 90.7 kg (200 lb)   SpO2 98%   BMI 30.41 kg/m²     Physical Exam  Vitals and nursing note reviewed.   Constitutional:       General: She is not in acute distress.     Appearance: She is well-developed.   HENT:      Head: Normocephalic and atraumatic.      Nose: Nose normal.      Mouth/Throat:      Mouth: Mucous membranes are moist.      Pharynx: Oropharynx is clear.   Eyes:      Conjunctiva/sclera: Conjunctivae normal.   Cardiovascular:      Rate and Rhythm: Normal rate and regular rhythm.      Heart sounds: No murmur heard.  Pulmonary:      Effort: Pulmonary effort is normal. No respiratory distress.      Breath sounds: Normal breath sounds.   Abdominal:      General: Bowel sounds are normal.      Palpations: Abdomen is soft.      Tenderness: There is no abdominal tenderness.   Genitourinary:     Comments: Exam deferred  Musculoskeletal:         General: No swelling.      Cervical back: Neck supple.   Skin:     General: Skin is warm and dry.      Capillary Refill: Capillary refill takes less than 2 seconds.   Neurological:      Mental Status: She is alert and oriented to " person, place, and time.   Psychiatric:         Mood and Affect: Mood normal.          ESME Garcia   [facility]

## 2024-03-26 ENCOUNTER — OFFICE VISIT (OUTPATIENT)
Dept: URGENT CARE | Facility: MEDICAL CENTER | Age: 24
End: 2024-03-26
Payer: COMMERCIAL

## 2024-03-26 VITALS — RESPIRATION RATE: 18 BRPM | TEMPERATURE: 98.5 F | HEART RATE: 9 BPM | OXYGEN SATURATION: 98 %

## 2024-03-26 DIAGNOSIS — T23.262A PARTIAL THICKNESS BURN OF BACK OF LEFT HAND, INITIAL ENCOUNTER: Primary | ICD-10-CM

## 2024-03-26 DIAGNOSIS — Z23 ENCOUNTER FOR IMMUNIZATION: ICD-10-CM

## 2024-03-26 PROCEDURE — S9088 SERVICES PROVIDED IN URGENT: HCPCS | Performed by: PHYSICIAN ASSISTANT

## 2024-03-26 PROCEDURE — 99212 OFFICE O/P EST SF 10 MIN: CPT | Performed by: PHYSICIAN ASSISTANT

## 2024-03-26 PROCEDURE — 90471 IMMUNIZATION ADMIN: CPT | Performed by: PHYSICIAN ASSISTANT

## 2024-03-26 PROCEDURE — 90715 TDAP VACCINE 7 YRS/> IM: CPT

## 2024-03-26 RX ORDER — CEPHALEXIN 500 MG/1
500 CAPSULE ORAL EVERY 8 HOURS SCHEDULED
Qty: 21 CAPSULE | Refills: 0 | Status: SHIPPED | OUTPATIENT
Start: 2024-03-26 | End: 2024-04-02

## 2024-03-26 NOTE — PROGRESS NOTES
[facility]        NAME: Ladi Lopez is a 24 y.o. female  : 2000    MRN: [id number]  DATE: 2024  TIME: 1:20 PM    Assessment and Plan   Partial thickness burn of back of left hand, initial encounter [T23.262A]  1. Partial thickness burn of back of left hand, initial encounter  silver sulfadiazine (Silvadene) 1 % cream    cephalexin (KEFLEX) 500 mg capsule      2. Encounter for immunization  Tdap Vaccine greater than or equal to 8yo            Patient Instructions     Cleans wound with soap and water daily  Apply Silvadene to burn daily  Start Keflex  If symptoms fail to improve follow up with PCP    Follow up with PCP in 3-5 days.  Proceed to  ER if symptoms worsen.    If tests have been performed at [facility], our office will contact you with results if changes need to be made to the care plan discussed with you at the visit.  You can review your full results on [facility].    Chief Complaint     Chief Complaint   Patient presents with   • Hand Burn     4 days ago         History of Present Illness       Patient presents with a 4-day old burn of her left hand at the base of her left thumb.  She burned her hand on an oven.  She cleansed the wound and then proceeded to place new skin on the burn.  She was able to wipe off the majority of the new skin.  What alarmed her today with increased redness and discomfort of the burn.  Last tetanus was in 2014 therefore needs to be updated today.  Mother requests Rx for Silvadene.        Review of Systems   Review of Systems   Constitutional:  Negative for fever.   Skin:  Positive for wound.   Neurological:  Negative for weakness.         Current Medications       Current Outpatient Medications:   •  cephalexin (KEFLEX) 500 mg capsule, Take 1 capsule (500 mg total) by mouth every 8 (eight) hours for 7 days, Disp: 21 capsule, Rfl: 0  •  medroxyPROGESTERone (DEPO-PROVERA) 150 mg/mL injection, Inject 1 mL (150 mg total) into a muscle every 3  (three) months, Disp: 1 mL, Rfl: 3  •  silver sulfadiazine (Silvadene) 1 % cream, Apply topically daily, Disp: 25 g, Rfl: 0  •  Coal Tar Extract 1 % LOTN, Apply topically 2 (two) times a day (Patient not taking: Reported on 3/26/2024), Disp: 120 mL, Rfl: 2  •  triamcinolone (KENALOG) 0.1 % cream, Apply topically 2 (two) times a day (Patient not taking: Reported on 3/26/2024), Disp: 30 g, Rfl: 2    Current Allergies     Allergies as of 03/26/2024   • (No Known Allergies)            The following portions of the patient's history were reviewed and updated as appropriate: allergies, current medications, past family history, past medical history, past social history, past surgical history and problem list.     Past Medical History:   Diagnosis Date   • Allergic    • GERD (gastroesophageal reflux disease)        Past Surgical History:   Procedure Laterality Date   • CHOLECYSTECTOMY LAPAROSCOPIC N/A 5/24/2023    Procedure: CHOLECYSTECTOMY LAPAROSCOPIC;  Surgeon: Karen Yeh MD;  Location: [facility];  Service: General   • WISDOM TOOTH EXTRACTION         Family History   Problem Relation Age of Onset   • No Known Problems Mother    • No Known Problems Brother          Medications have been verified.        Objective   Pulse (!) 9   Temp 98.5 °F (36.9 °C)   Resp 18   LMP 03/26/2024 (Exact Date) Comment: depo shot , irregular periods , denies preg  SpO2 98%   Patient's last menstrual period was 03/26/2024 (exact date).       Physical Exam     Physical Exam  Vitals and nursing note reviewed.   Constitutional:       Appearance: Normal appearance.   HENT:      Head: Normocephalic and atraumatic.   Cardiovascular:      Rate and Rhythm: Normal rate.   Pulmonary:      Effort: Pulmonary effort is normal.   Musculoskeletal:      Comments: Linear partial skin thickness burn on the dorsum of her left hand and just beneath the left thumb.  Mild serosanguineous drainage.  There is surrounding erythema which extends into the  thumb.  Full range of motion of her left thumb.  There is pain while over the burn when the left thenar area is palpated.   Skin:     General: Skin is warm.   Neurological:      Mental Status: She is alert.

## 2024-03-26 NOTE — PATIENT INSTRUCTIONS
Cleans wound with soap and water daily  Apply Silvadene to burn daily  Start Keflex  If symptoms fail to improve follow up with PCP

## 2024-04-03 ENCOUNTER — TELEPHONE (OUTPATIENT)
Dept: DENTISTRY | Facility: CLINIC | Age: 24
End: 2024-04-03

## 2024-04-03 NOTE — TELEPHONE ENCOUNTER
I called PT, as per Piedad Ocasio, in an attempt to schedule her for an exam with Dr. Mason.  I LVM.    SB

## 2024-04-19 ENCOUNTER — TELEPHONE (OUTPATIENT)
Dept: DENTISTRY | Facility: CLINIC | Age: 24
End: 2024-04-19

## 2024-04-19 NOTE — TELEPHONE ENCOUNTER
PT Called to cancel today's appt with Dr. Naranjo.  She said that she's aware that Piedad wanted her to have an exam by the dentist but she wants to wait.  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  I informed her that dentist appts are being booked very far out and she still wanted to cancel, said she'll talk to Piedad next time she comes in - I informed her of that next appt date.  PT is aware that Piedad feels she may have cavities.    Piedad asked me to call back to encourage PT to keep appt, she's concerned that she may have cavities.  I attempted to call PT back and the phone simply rang for quite some time so I hung up.    SB

## 2024-04-23 ENCOUNTER — TELEMEDICINE (OUTPATIENT)
Dept: FAMILY MEDICINE CLINIC | Facility: CLINIC | Age: 24
End: 2024-04-23
Payer: COMMERCIAL

## 2024-04-23 DIAGNOSIS — Z30.42 ON DEPO-PROVERA FOR CONTRACEPTION: Primary | ICD-10-CM

## 2024-04-23 DIAGNOSIS — L30.1 DYSHIDROTIC ECZEMA: ICD-10-CM

## 2024-04-23 PROCEDURE — 99213 OFFICE O/P EST LOW 20 MIN: CPT | Performed by: FAMILY MEDICINE

## 2024-04-23 PROCEDURE — 99213 OFFICE O/P EST LOW 20 MIN: CPT

## 2024-04-23 RX ORDER — TRIAMCINOLONE ACETONIDE 1 MG/G
CREAM TOPICAL 2 TIMES DAILY
Qty: 30 G | Refills: 2 | Status: SHIPPED | OUTPATIENT
Start: 2024-04-23

## 2024-04-23 NOTE — PROGRESS NOTES
Virtual Regular Visit    Verification of patient location:    Patient is located at Home in the following state in which I hold an active license [place]      Assessment/Plan:    Problem List Items Addressed This Visit       On Depo-Provera for contraception - Primary     Received second inj 1/26  Shortly after first inj, intermittent hives  Hives have not stopped  despite no longer receiving Depo  Patient considering restarting Depo  Currently still spotting, making her recent period 1mo long  Will continue to follow         Dyshidrotic eczema     6mo h/o intermittent hives of extremities  3-4in circular itchy erythematous several at once, self-resolving  Per derm, may be eczema, prescribed steroid cream, relieves itch  Timing lined up with starting Depo-provera, but has not resolved since stopping in Feb  Will consider seeing in-network Allergist  Will try Kenalog cream 0.1%   F/u 1mo         Relevant Medications    triamcinolone (KENALOG) 0.1 % cream            Reason for visit is   Chief Complaint   Patient presents with    Virtual Regular Visit     Pt was scheduled for pap exam, states she is still having her menses due to depo shot, wanted to discuss and follow up     Virtual Regular Visit          Encounter provider Amanda Zaman MD    Provider located at [address] [place] [address]      Recent Visits  No visits were found meeting these conditions.  Showing recent visits within past 7 days and meeting all other requirements  Today's Visits  Date Type Provider Dept   04/23/24 Telemedicine Amanda Zaman MD [facility]   Showing today's visits and meeting all other requirements  Future Appointments  No visits were found meeting these conditions.  Showing future appointments within next 150 days and meeting all other requirements       The patient was identified by name and date of birth. Ladi John was  informed that this is a telemedicine visit and that the visit is being conducted through the [employer] platform. She agrees to proceed..  My office door was closed. The patient was notified the following individuals were present in the room medical student.  She acknowledged consent and understanding of privacy and security of the video platform. The patient has agreed to participate and understands they can discontinue the visit at any time.    Patient is aware this is a billable service.     Subjective  Ladi Lopez is a 24 y.o. female being seen by telemed regarding birth control and allergies.      Ms Lopez is a 24F w no significant PMH, following after allergic reaction potentially 2/2 to Depo provera shot. Patient has now experienced 6mo h/o intermittent hives of extremities, described as 3-4in circular itchy erythematous several at once, self-resolving. Per derm, may be eczema, prescribed steroid cream, which relieved itch but did not resolve the lesions themselves. Timing lined up with starting Depo-provera, but has not resolved since stopping in Feb. We had referred for allergy testing, but the providers were not covered by her insurance. Will consider seeing in-network Allergist in a month or so after finals. Will try Kenalog cream 0.1%, and f/u 1mo.    Patient is considering restarting Depo for birth control. Currently still spotting, making her recent period 1mo long. Will continue to follow.           Past Medical History:   Diagnosis Date    Allergic     GERD (gastroesophageal reflux disease)        Past Surgical History:   Procedure Laterality Date    CHOLECYSTECTOMY LAPAROSCOPIC N/A 5/24/2023    Procedure: CHOLECYSTECTOMY LAPAROSCOPIC;  Surgeon: Karen Yeh MD;  Location: [facility];  Service: General    WISDOM TOOTH EXTRACTION         Current Outpatient Medications   Medication Sig Dispense Refill    triamcinolone (KENALOG) 0.1 % cream Apply topically 2 (two) times a day 30 g 2    Coal Tar  Extract 1 % LOTN Apply topically 2 (two) times a day (Patient not taking: Reported on 3/26/2024) 120 mL 2    medroxyPROGESTERone (DEPO-PROVERA) 150 mg/mL injection Inject 1 mL (150 mg total) into a muscle every 3 (three) months 1 mL 3     No current facility-administered medications for this visit.        No Known Allergies    Review of Systems   Constitutional:  Negative for chills and fever.   HENT:  Negative for congestion, ear pain, facial swelling, postnasal drip, rhinorrhea, sinus pressure, sinus pain, sneezing, sore throat and tinnitus.    Eyes:  Negative for pain and visual disturbance.   Respiratory:  Negative for cough, chest tightness, shortness of breath and wheezing.    Cardiovascular:  Negative for chest pain and palpitations.   Gastrointestinal:  Negative for abdominal pain, constipation, diarrhea, nausea and vomiting.   Genitourinary:  Negative for dysuria and hematuria.   Musculoskeletal:  Negative for arthralgias, back pain, joint swelling, myalgias and neck pain.   Skin:  Positive for rash. Negative for color change.   Neurological:  Negative for dizziness, seizures, syncope, weakness, light-headedness, numbness and headaches.   Psychiatric/Behavioral:  Negative for agitation, confusion, dysphoric mood and sleep disturbance. The patient is not nervous/anxious.    All other systems reviewed and are negative.      Video Exam    There were no vitals filed for this visit.    Physical Exam  Constitutional:       General: She is not in acute distress.     Appearance: Normal appearance. She is not ill-appearing.   HENT:      Head: Normocephalic and atraumatic.      Nose: Nose normal.   Pulmonary:      Effort: Pulmonary effort is normal.   Musculoskeletal:      Cervical back: Normal range of motion.   Neurological:      Mental Status: She is alert and oriented to person, place, and time.   Psychiatric:         Mood and Affect: Mood normal.         Behavior: Behavior normal.          Visit Time  Total  Visit Duration: 16min

## 2024-04-23 NOTE — ASSESSMENT & PLAN NOTE
Received second inj 1/26  Shortly after first inj, intermittent hives  Hives have not stopped  despite no longer receiving Depo  Patient considering restarting Depo  Currently still spotting, making her recent period 1mo long  Will continue to follow

## 2024-04-23 NOTE — ASSESSMENT & PLAN NOTE
6mo h/o intermittent hives of extremities  3-4in circular itchy erythematous several at once, self-resolving  Per derm, may be eczema, prescribed steroid cream, relieves itch  Timing lined up with starting Depo-provera, but has not resolved since stopping in Feb  Will consider seeing in-network Allergist  Will try Kenalog cream 0.1%   F/u 1mo

## 2024-07-03 ENCOUNTER — APPOINTMENT (EMERGENCY)
Dept: RADIOLOGY | Facility: HOSPITAL | Age: 24
End: 2024-07-03
Payer: COMMERCIAL

## 2024-07-03 ENCOUNTER — APPOINTMENT (EMERGENCY)
Dept: NON INVASIVE DIAGNOSTICS | Facility: HOSPITAL | Age: 24
End: 2024-07-03
Payer: COMMERCIAL

## 2024-07-03 ENCOUNTER — HOSPITAL ENCOUNTER (EMERGENCY)
Facility: HOSPITAL | Age: 24
Discharge: HOME | End: 2024-07-03
Attending: EMERGENCY MEDICINE
Payer: COMMERCIAL

## 2024-07-03 VITALS
HEIGHT: 68 IN | OXYGEN SATURATION: 97 % | RESPIRATION RATE: 19 BRPM | HEART RATE: 76 BPM | WEIGHT: 195.11 LBS | TEMPERATURE: 97.6 F | SYSTOLIC BLOOD PRESSURE: 126 MMHG | DIASTOLIC BLOOD PRESSURE: 89 MMHG | BODY MASS INDEX: 29.57 KG/M2

## 2024-07-03 DIAGNOSIS — U07.1 COVID-19 VIRUS INFECTION: Primary | ICD-10-CM

## 2024-07-03 DIAGNOSIS — R07.89 CHEST PRESSURE: ICD-10-CM

## 2024-07-03 LAB
2HR DELTA HS TROPONIN: -137 NG/L
4HR DELTA HS TROPONIN: <-138 NG/L
ALBUMIN SERPL BCG-MCNC: 4.7 G/DL (ref 3.5–5)
ALP SERPL-CCNC: 64 U/L (ref 34–104)
ALT SERPL W P-5'-P-CCNC: 25 U/L (ref 7–52)
ANION GAP SERPL CALCULATED.3IONS-SCNC: 11 MMOL/L (ref 4–13)
AST SERPL W P-5'-P-CCNC: 18 U/L (ref 13–39)
ATRIAL RATE: 101 BPM
ATRIAL RATE: 67 BPM
ATRIAL RATE: 84 BPM
ATRIAL RATE: 97 BPM
BASOPHILS # BLD AUTO: 0.05 THOUSANDS/ÂΜL (ref 0–0.1)
BASOPHILS NFR BLD AUTO: 1 % (ref 0–1)
BILIRUB SERPL-MCNC: 0.87 MG/DL (ref 0.2–1)
BSA FOR ECHO PROCEDURE: 2.02 M2
BUN SERPL-MCNC: 9 MG/DL (ref 5–25)
CALCIUM SERPL-MCNC: 9.7 MG/DL (ref 8.4–10.2)
CARDIAC TROPONIN I PNL SERPL HS: 140 NG/L
CARDIAC TROPONIN I PNL SERPL HS: 3 NG/L
CARDIAC TROPONIN I PNL SERPL HS: <2 NG/L
CHLORIDE SERPL-SCNC: 104 MMOL/L (ref 96–108)
CO2 SERPL-SCNC: 22 MMOL/L (ref 21–32)
CREAT SERPL-MCNC: 0.79 MG/DL (ref 0.6–1.3)
D DIMER PPP FEU-MCNC: 0.32 UG/ML FEU
EOSINOPHIL # BLD AUTO: 0.26 THOUSAND/ÂΜL (ref 0–0.61)
EOSINOPHIL NFR BLD AUTO: 3 % (ref 0–6)
ERYTHROCYTE [DISTWIDTH] IN BLOOD BY AUTOMATED COUNT: 11.4 % (ref 11.6–15.1)
FLUAV RNA RESP QL NAA+PROBE: NEGATIVE
FLUBV RNA RESP QL NAA+PROBE: NEGATIVE
GFR SERPL CREATININE-BSD FRML MDRD: 106 ML/MIN/1.73SQ M
GLUCOSE SERPL-MCNC: 100 MG/DL (ref 65–140)
HCT VFR BLD AUTO: 44.4 % (ref 34.8–46.1)
HGB BLD-MCNC: 15.1 G/DL (ref 11.5–15.4)
IMM GRANULOCYTES # BLD AUTO: 0.02 THOUSAND/UL (ref 0–0.2)
IMM GRANULOCYTES NFR BLD AUTO: 0 % (ref 0–2)
LYMPHOCYTES # BLD AUTO: 1.04 THOUSANDS/ÂΜL (ref 0.6–4.47)
LYMPHOCYTES NFR BLD AUTO: 13 % (ref 14–44)
MCH RBC QN AUTO: 29.1 PG (ref 26.8–34.3)
MCHC RBC AUTO-ENTMCNC: 34 G/DL (ref 31.4–37.4)
MCV RBC AUTO: 86 FL (ref 82–98)
MONOCYTES # BLD AUTO: 1.21 THOUSAND/ÂΜL (ref 0.17–1.22)
MONOCYTES NFR BLD AUTO: 15 % (ref 4–12)
NEUTROPHILS # BLD AUTO: 5.6 THOUSANDS/ÂΜL (ref 1.85–7.62)
NEUTS SEG NFR BLD AUTO: 68 % (ref 43–75)
NRBC BLD AUTO-RTO: 0 /100 WBCS
P AXIS: 44 DEGREES
P AXIS: 50 DEGREES
P AXIS: 58 DEGREES
P AXIS: 59 DEGREES
PLATELET # BLD AUTO: 294 THOUSANDS/UL (ref 149–390)
PMV BLD AUTO: 9.1 FL (ref 8.9–12.7)
POTASSIUM SERPL-SCNC: 3.6 MMOL/L (ref 3.5–5.3)
PR INTERVAL: 148 MS
PR INTERVAL: 150 MS
PR INTERVAL: 158 MS
PR INTERVAL: 172 MS
PROT SERPL-MCNC: 7.4 G/DL (ref 6.4–8.4)
QRS AXIS: 57 DEGREES
QRS AXIS: 62 DEGREES
QRS AXIS: 68 DEGREES
QRS AXIS: 69 DEGREES
QRSD INTERVAL: 80 MS
QRSD INTERVAL: 80 MS
QRSD INTERVAL: 84 MS
QRSD INTERVAL: 84 MS
QT INTERVAL: 344 MS
QT INTERVAL: 348 MS
QT INTERVAL: 378 MS
QT INTERVAL: 388 MS
QTC INTERVAL: 409 MS
QTC INTERVAL: 436 MS
QTC INTERVAL: 446 MS
QTC INTERVAL: 451 MS
RBC # BLD AUTO: 5.19 MILLION/UL (ref 3.81–5.12)
RSV RNA RESP QL NAA+PROBE: NEGATIVE
SARS-COV-2 RNA RESP QL NAA+PROBE: POSITIVE
SL CV LV EF: 60
SODIUM SERPL-SCNC: 137 MMOL/L (ref 135–147)
T WAVE AXIS: 13 DEGREES
T WAVE AXIS: 22 DEGREES
T WAVE AXIS: 23 DEGREES
T WAVE AXIS: 27 DEGREES
VENTRICULAR RATE: 101 BPM
VENTRICULAR RATE: 67 BPM
VENTRICULAR RATE: 84 BPM
VENTRICULAR RATE: 97 BPM
WBC # BLD AUTO: 8.18 THOUSAND/UL (ref 4.31–10.16)

## 2024-07-03 PROCEDURE — 36415 COLL VENOUS BLD VENIPUNCTURE: CPT | Performed by: PHYSICIAN ASSISTANT

## 2024-07-03 PROCEDURE — 93010 ELECTROCARDIOGRAM REPORT: CPT | Performed by: INTERNAL MEDICINE

## 2024-07-03 PROCEDURE — 84484 ASSAY OF TROPONIN QUANT: CPT | Performed by: PHYSICIAN ASSISTANT

## 2024-07-03 PROCEDURE — 71046 X-RAY EXAM CHEST 2 VIEWS: CPT

## 2024-07-03 PROCEDURE — 80053 COMPREHEN METABOLIC PANEL: CPT | Performed by: PHYSICIAN ASSISTANT

## 2024-07-03 PROCEDURE — 93005 ELECTROCARDIOGRAM TRACING: CPT

## 2024-07-03 PROCEDURE — 85025 COMPLETE CBC W/AUTO DIFF WBC: CPT | Performed by: PHYSICIAN ASSISTANT

## 2024-07-03 PROCEDURE — 93306 TTE W/DOPPLER COMPLETE: CPT | Performed by: INTERNAL MEDICINE

## 2024-07-03 PROCEDURE — 85379 FIBRIN DEGRADATION QUANT: CPT | Performed by: PHYSICIAN ASSISTANT

## 2024-07-03 PROCEDURE — 99285 EMERGENCY DEPT VISIT HI MDM: CPT | Performed by: PHYSICIAN ASSISTANT

## 2024-07-03 PROCEDURE — 93306 TTE W/DOPPLER COMPLETE: CPT

## 2024-07-03 PROCEDURE — 0241U HB NFCT DS VIR RESP RNA 4 TRGT: CPT | Performed by: PHYSICIAN ASSISTANT

## 2024-07-03 RX ORDER — IPRATROPIUM BROMIDE AND ALBUTEROL SULFATE 2.5; .5 MG/3ML; MG/3ML
3 SOLUTION RESPIRATORY (INHALATION) ONCE
Status: COMPLETED | OUTPATIENT
Start: 2024-07-03 | End: 2024-07-03

## 2024-07-03 RX ADMIN — IPRATROPIUM BROMIDE AND ALBUTEROL SULFATE 3 ML: 2.5; .5 SOLUTION RESPIRATORY (INHALATION) at 11:24

## 2024-07-03 NOTE — ED PROVIDER NOTES
History  Chief Complaint   Patient presents with    Shortness of Breath     Patient reports feeling SOB and as though someone is sitting on her chest, reports this started this morning.      24 year old female with PMH GERD, allergies presenting for evaluation of shortness of breath.  Pt reports this started over the past 2 days.  She notes she has been experiencing some sinus congestion and cough.  Notes some yellow phlegm.  No measured fevers but reports feeling hot and feverish.  Took ibuprofen around 10 am today.  She notes a mild headache which she contributes to sinus pressure.  She reports since this morning she has been feeling like someone is sitting on her chest.  She describes a pressure in the middle of her chest.  She reports she feels short of breath with this but believes she is feeling this way as she can't breath out of her nose due to the stuffiness.  Nothing seems to make it better or worse.  No reported wheezing.  No reported history of asthma.  She notes some nausea but has resolved.  Denies vomiting, diarrhea, abdominal pain. Denies leg pain or swelling.  No personal h/o DVT/PE.  She is on depo shot for birth control.  She reports she did travel from [place] within the past week.  No known sick contacts at home.  She does vape but states she hasn't done so in the past 2 days.      History provided by:  Patient and medical records   used: No    Shortness of Breath  Duration:  2 days  Chronicity:  New  Context: URI    Relieved by:  Nothing  Worsened by:  Nothing  Ineffective treatments:  NSAIDs and rest  Associated symptoms: chest pain, cough, fever, headaches and sputum production    Associated symptoms: no abdominal pain, no diaphoresis, no ear pain, no hemoptysis, no neck pain, no rash, no sore throat, no syncope, no vomiting and no wheezing    Risk factors: tobacco use    Risk factors: no hx of cancer, no hx of PE/DVT, no prolonged immobilization and no recent surgery         Prior to Admission Medications   Prescriptions Last Dose Informant Patient Reported? Taking?   Coal Tar Extract 1 % LOTN   No No   Sig: Apply topically 2 (two) times a day   Patient not taking: Reported on 3/26/2024   medroxyPROGESTERone (DEPO-PROVERA) 150 mg/mL injection   No Yes   Sig: Inject 1 mL (150 mg total) into a muscle every 3 (three) months   triamcinolone (KENALOG) 0.1 % cream Not Taking  No No   Sig: Apply topically 2 (two) times a day   Patient not taking: Reported on 7/3/2024      Facility-Administered Medications: None       Past Medical History:   Diagnosis Date    Allergic     GERD (gastroesophageal reflux disease)        Past Surgical History:   Procedure Laterality Date    CHOLECYSTECTOMY LAPAROSCOPIC N/A 5/24/2023    Procedure: CHOLECYSTECTOMY LAPAROSCOPIC;  Surgeon: Karen Yeh MD;  Location: [facility];  Service: General    WISDOM TOOTH EXTRACTION         Family History   Problem Relation Age of Onset    No Known Problems Mother     No Known Problems Brother      I have reviewed and agree with the history as documented.    E-Cigarette/Vaping    E-Cigarette Use Current Every Day User      E-Cigarette/Vaping Substances    Nicotine Yes     THC No     CBD No     Flavoring Yes     Other No     Unknown No      Social History     Tobacco Use    Smoking status: Never    Smokeless tobacco: Never   Vaping Use    Vaping status: Every Day    Substances: Nicotine, Flavoring   Substance Use Topics    Alcohol use: Yes     Comment: socially    Drug use: Never       Review of Systems   Constitutional:  Positive for fever. Negative for chills, diaphoresis and fatigue.   HENT:  Positive for congestion and sinus pressure. Negative for ear pain, rhinorrhea and sore throat.    Eyes: Negative.  Negative for visual disturbance.   Respiratory:  Positive for cough, sputum production and shortness of breath. Negative for hemoptysis and wheezing.    Cardiovascular:  Positive for chest pain. Negative for  palpitations, leg swelling and syncope.   Gastrointestinal:  Positive for nausea. Negative for abdominal pain, constipation, diarrhea and vomiting.   Genitourinary: Negative.  Negative for dysuria, flank pain, frequency and hematuria.   Musculoskeletal: Negative.  Negative for back pain, myalgias and neck pain.   Skin: Negative.  Negative for rash.   Neurological:  Positive for headaches. Negative for dizziness and light-headedness.   Psychiatric/Behavioral: Negative.     All other systems reviewed and are negative.      Physical Exam  Physical Exam  Vitals and nursing note reviewed.   Constitutional:       General: She is awake. She is not in acute distress.     Appearance: She is well-developed. She is not toxic-appearing or diaphoretic.   HENT:      Head: Normocephalic and atraumatic.      Right Ear: Hearing and external ear normal.      Left Ear: Hearing and external ear normal.      Nose: Congestion present.      Mouth/Throat:      Mouth: Oropharynx is clear and moist and mucous membranes are normal. Mucous membranes are moist. No oral lesions.      Tongue: Tongue does not deviate from midline.      Pharynx: Oropharynx is clear. Uvula midline. No oropharyngeal exudate.   Eyes:      General: Lids are normal. No scleral icterus.     Extraocular Movements: EOM normal.      Conjunctiva/sclera: Conjunctivae normal.      Pupils: Pupils are equal, round, and reactive to light.   Neck:      Trachea: Trachea and phonation normal. No tracheal deviation.   Cardiovascular:      Rate and Rhythm: Regular rhythm. Tachycardia present.      Pulses: Normal pulses.           Radial pulses are 2+ on the right side and 2+ on the left side.        Dorsalis pedis pulses are 2+ on the right side and 2+ on the left side.        Posterior tibial pulses are 2+ on the right side and 2+ on the left side.      Heart sounds: Normal heart sounds, S1 normal and S2 normal. No murmur heard.  Pulmonary:      Effort: Pulmonary effort is normal. No  tachypnea or respiratory distress.      Breath sounds: Normal breath sounds. No stridor. No wheezing, rhonchi or rales.   Abdominal:      General: Bowel sounds are normal. There is no distension.      Palpations: Abdomen is soft.      Tenderness: There is no abdominal tenderness. There is no CVA tenderness, guarding or rebound.   Musculoskeletal:         General: No tenderness or edema.      Cervical back: Normal range of motion and neck supple.      Right lower leg: No tenderness. No edema.      Left lower leg: No tenderness. No edema.   Skin:     General: Skin is warm and dry.      Capillary Refill: Capillary refill takes less than 2 seconds.      Findings: No rash.   Neurological:      General: No focal deficit present.      Mental Status: She is alert and oriented to person, place, and time.      GCS: GCS eye subscore is 4. GCS verbal subscore is 5. GCS motor subscore is 6.      Cranial Nerves: No cranial nerve deficit.      Sensory: No sensory deficit.      Motor: No abnormal muscle tone.      Gait: Gait normal.   Psychiatric:         Mood and Affect: Mood is anxious.         Speech: Speech normal.         Behavior: Behavior normal. Behavior is cooperative.         Vital Signs  ED Triage Vitals [07/03/24 1104]   Temperature Pulse Respirations Blood Pressure SpO2   97.6 °F (36.4 °C) 104 20 139/86 98 %      Temp Source Heart Rate Source Patient Position - Orthostatic VS BP Location FiO2 (%)   Temporal Monitor Sitting Right arm --      Pain Score       No Pain           Vitals:    07/03/24 1300 07/03/24 1400 07/03/24 1500 07/03/24 1600   BP: 120/66 122/83 120/63 126/89   Pulse: 81 75 73 76   Patient Position - Orthostatic VS:  Sitting Sitting Sitting         Visual Acuity      ED Medications  Medications   ipratropium-albuterol (DUO-NEB) 0.5-2.5 mg/3 mL inhalation solution 3 mL (3 mL Nebulization Given 7/3/24 1124)       Diagnostic Studies  Results Reviewed       Procedure Component Value Units Date/Time    HS  Troponin I 4hr [[id number]]  (Normal) Collected: 07/03/24 1526    Lab Status: Final result Specimen: Blood from Arm, Left Updated: 07/03/24 1552     hs TnI 4hr <2 ng/L      Delta 4hr hsTnI <-138 ng/L     HS Troponin I 2hr [[id number]]  (Normal) Collected: 07/03/24 1321    Lab Status: Final result Specimen: Blood from Hand, Left Updated: 07/03/24 1348     hs TnI 2hr 3 ng/L      Delta 2hr hsTnI -137 ng/L     FLU/RSV/COVID - if FLU/RSV clinically relevant [[id number]]  (Abnormal) Collected: 07/03/24 1122    Lab Status: Final result Specimen: Nares from Nose Updated: 07/03/24 1211     SARS-CoV-2 Positive     INFLUENZA A PCR Negative     INFLUENZA B PCR Negative     RSV PCR Negative    Narrative:      FOR PEDIATRIC PATIENTS - copy/paste COVID Guidelines URL to browser: [email or web address]    SARS-CoV-2 assay is a Nucleic Acid Amplification assay intended for the  qualitative detection of nucleic acid from SARS-CoV-2 in nasopharyngeal  swabs. Results are for the presumptive identification of SARS-CoV-2 RNA.    Positive results are indicative of infection with SARS-CoV-2, the virus  causing COVID-19, but do not rule out bacterial infection or co-infection  with other viruses. Laboratories within the [place] and its  territories are required to report all positive results to the appropriate  public health authorities. Negative results do not preclude SARS-CoV-2  infection and should not be used as the sole basis for treatment or other  patient management decisions. Negative results must be combined with  clinical observations, patient history, and epidemiological information.  This test has not been FDA cleared or approved.    This test has been authorized by FDA under an Emergency Use Authorization  (EUA). This test is only authorized for the duration of time the  declaration that circumstances exist justifying the authorization of the  emergency use of an in vitro  diagnostic tests for detection of SARS-CoV-2  virus and/or diagnosis of COVID-19 infection under section 564(b)(1) of  the Act, 21 U.S.C. 360bbb-3(b)(1), unless the authorization is terminated  or revoked sooner. The test has been validated but independent review by FDA  and CLIA is pending.    Test performed using [employer] GeneXpert: This RT-PCR assay targets N2,  a region unique to SARS-CoV-2. A conserved region in the E-gene was chosen  for pan-Sarbecovirus detection which includes SARS-CoV-2.    According to CMS-2020-01-R, this platform meets the definition of high-throughput technology.    HS Troponin 0hr (reflex protocol) [[id number]]  (Abnormal) Collected: 07/03/24 1122    Lab Status: Final result Specimen: Blood from Hand, Left Updated: 07/03/24 1153     hs TnI 0hr 140 ng/L     Comprehensive metabolic panel [[id number]] Collected: 07/03/24 1122    Lab Status: Final result Specimen: Blood from Hand, Left Updated: 07/03/24 1146     Sodium 137 mmol/L      Potassium 3.6 mmol/L      Chloride 104 mmol/L      CO2 22 mmol/L      ANION GAP 11 mmol/L      BUN 9 mg/dL      Creatinine 0.79 mg/dL      Glucose 100 mg/dL      Calcium 9.7 mg/dL      AST 18 U/L      ALT 25 U/L      Alkaline Phosphatase 64 U/L      Total Protein 7.4 g/dL      Albumin 4.7 g/dL      Total Bilirubin 0.87 mg/dL      eGFR 106 ml/min/1.73sq m     Narrative:      National Kidney Disease Foundation guidelines for Chronic Kidney Disease (CKD):     Stage 1 with normal or high GFR (GFR > 90 mL/min/1.73 square meters)    Stage 2 Mild CKD (GFR = 60-89 mL/min/1.73 square meters)    Stage 3A Moderate CKD (GFR = 45-59 mL/min/1.73 square meters)    Stage 3B Moderate CKD (GFR = 30-44 mL/min/1.73 square meters)    Stage 4 Severe CKD (GFR = 15-29 mL/min/1.73 square meters)    Stage 5 End Stage CKD (GFR <15 mL/min/1.73 square meters)  Note: GFR calculation is accurate only with a steady state creatinine    D-Dimer [[id number]]  (Normal) Collected: 07/03/24 1122     Lab Status: Final result Specimen: Blood from Arm, Left Updated: 07/03/24 1142     D-Dimer, Quant 0.32 ug/ml FEU     CBC and differential [[id number]]  (Abnormal) Collected: 07/03/24 1122    Lab Status: Final result Specimen: Blood from Hand, Left Updated: 07/03/24 1131     WBC 8.18 Thousand/uL      RBC 5.19 Million/uL      Hemoglobin 15.1 g/dL      Hematocrit 44.4 %      MCV 86 fL      MCH 29.1 pg      MCHC 34.0 g/dL      RDW 11.4 %      MPV 9.1 fL      Platelets 294 Thousands/uL      nRBC 0 /100 WBCs      Segmented % 68 %      Immature Grans % 0 %      Lymphocytes % 13 %      Monocytes % 15 %      Eosinophils Relative 3 %      Basophils Relative 1 %      Absolute Neutrophils 5.60 Thousands/µL      Absolute Immature Grans 0.02 Thousand/uL      Absolute Lymphocytes 1.04 Thousands/µL      Absolute Monocytes 1.21 Thousand/µL      Eosinophils Absolute 0.26 Thousand/µL      Basophils Absolute 0.05 Thousands/µL     POCT pregnancy, urine [[id number]]     Lab Status: No result                    XR chest 2 views   Final Result by Eliseo Thakkar MD (07/03 1648)      No acute cardiopulmonary disease.            Workstation performed: [id number]                    Procedures  ECG 12 Lead Documentation Only    Date/Time: 7/3/2024 11:12 AM    Performed by: Chanelle Miller PA-C  Authorized by: Chanelle Miller PA-C    Indications / Diagnosis:  Chest pain  ECG reviewed by me, the ED Provider: yes    Patient location:  ED  Previous ECG:     Comparison to cardiac monitor: Yes    Interpretation:     Interpretation: non-specific    Rate:     ECG rate:  97    ECG rate assessment: normal    Rhythm:     Rhythm: sinus rhythm    Ectopy:     Ectopy: none    QRS:     QRS axis:  Normal    QRS intervals:  Normal  Conduction:     Conduction: normal    ST segments:     ST segments:  Normal  T waves:     T waves: non-specific and inverted      Inverted:  III  Comments:      , QRS 80, QT//436; no acute ischemic changes.  ECG 12  Lead Documentation Only    Date/Time: 7/3/2024 1:23 PM    Performed by: Chanelle Miller PA-C  Authorized by: Chanelle Miller PA-C    Indications / Diagnosis:  Chest pain  ECG reviewed by me, the ED Provider: yes    Patient location:  ED  Previous ECG:     Previous ECG:  Compared to current    Similarity:  No change    Comparison to cardiac monitor: Yes    Interpretation:     Interpretation: non-specific    Rate:     ECG rate:  84    ECG rate assessment: normal    Rhythm:     Rhythm: sinus rhythm    Ectopy:     Ectopy: none    QRS:     QRS axis:  Normal    QRS intervals:  Normal  Conduction:     Conduction: normal    ST segments:     ST segments:  Normal  T waves:     T waves: non-specific and inverted      Inverted:  III  Comments:      , QRS 84, QT//446; no acute ischemic changes.  ECG 12 Lead Documentation Only    Date/Time: 7/3/2024 3:29 PM    Performed by: Chanelle Miller PA-C  Authorized by: Chanelle Miller PA-C    Indications / Diagnosis:  Chest pain  ECG reviewed by me, the ED Provider: yes    Patient location:  ED  Previous ECG:     Previous ECG:  Compared to current    Comparison to cardiac monitor: Yes    Interpretation:     Interpretation: non-specific    Rate:     ECG rate:  67    ECG rate assessment: normal    Rhythm:     Rhythm: sinus rhythm    Ectopy:     Ectopy: none    QRS:     QRS axis:  Normal    QRS intervals:  Normal  Conduction:     Conduction: normal    ST segments:     ST segments:  Normal  T waves:     T waves: non-specific and inverted      Inverted:  III  Comments:      , QRS 84, QT//409; no acute ischemic changes, no significant interval change from earlier.           ED Course  ED Course as of 07/03/24 1735   Wed Jul 03, 2024   1131 WBC: 8.18   1131 Hemoglobin: 15.1   1131 Platelet Count: 294   1150 GLUCOSE: 100   1150 Creatinine: 0.79   1150 BUN: 9   1150 Sodium: 137   1150 Potassium: 3.6   1150 Chloride: 104   1150 Carbon Dioxide: 22   1151 ANION  GAP: 11   1151 Calcium: 9.7   1151 AST: 18   1151 ALT: 25   1151 ALK PHOS: 64   1151 Total Protein: 7.4   1151 Albumin: 4.7   1151 Total Bilirubin: 0.87   1151 GFR, Calculated: 106   1151 D-Dimer, Quant: 0.32  Negative, doubt PE   1206 hs TnI 0hr(!): 140  elevated   1209 XR chest 2 views  Independently viewed and interpreted by me - no acute cardiopulmonary process; pending official read.   1211 SARS-COV-2(!): Positive   1211 INFLU A PCR: Negative   1211 INFLU B PCR: Negative   1211 RSV PCR: Negative   1226 Epic secure chat to cardiology to further discuss re: elevation troponin and chest pain.   1229 Reviewed with Dr. Corbett of cardiology.  Recommends echo and continue to trend.  If concerned regarding jessica or pericarditis, recommends treatment with NSAIDs and colchicine.  Low suspicion that this represnts ACS.   1231 Pt updated on results and my conversation with cardiology.  She declines anything for pain but reports having continued pressure/heaviness.  She reports symptoms feel better sitting up.  Will plan on echo and serial EKG, troponins.   1246 Echo at bedside.   1321 Echo performed and pending interpretation, on my prelim interpretation, I do not appreciate an effusion, no significant noted wall motion abnormality; pending formal read.   1333 Repeat EKG unchanged.  Repeat troponin in process.   1349 hs TnI 2hr: 3  2 hr troponin is negative.  I question if first value was inaccurate?   1426 Pt reassessed.  Resting comfortably.  Awaiting echo results.  Will plan on 4 hr troponin given discrepancy between 1st and 2nd. If negative, anticipate discharge home.  She continues to decline anything for pain.   1447 Pt ambulatory to bathroom, steady gait.   1553 hs TnI 4hr: <2   1600 Pt updated on results of echo as well as 4 hr troponin.  She is resting comfortably.  Reviewed usual course and treatment of covid, will discharge home with symptomatic management and strict return precautions.             HEART Risk Score       Flowsheet Row Most Recent Value   Heart Score Risk Calculator    History 0 Filed at: 07/03/2024 1116   ECG 0 Filed at: 07/03/2024 1116   Age 0 Filed at: 07/03/2024 1116   Risk Factors 1 Filed at: 07/03/2024 1116   Troponin 0 Filed at: 07/03/2024 1116   HEART Score 1 Filed at: 07/03/2024 1116                          SBIRT 20yo+      Flowsheet Row Most Recent Value   Initial Alcohol Screen: US AUDIT-C     1. How often do you have a drink containing alcohol? 0 Filed at: 07/03/2024 1103   2. How many drinks containing alcohol do you have on a typical day you are drinking?  0 Filed at: 07/03/2024 1103   3a. Male UNDER 65: How often do you have five or more drinks on one occasion? 0 Filed at: 07/03/2024 1103   3b. FEMALE Any Age, or MALE 65+: How often do you have 4 or more drinks on one occassion? 0 Filed at: 07/03/2024 1103   Audit-C Score 0 Filed at: 07/03/2024 1103   SERGIO: How many times in the past year have you...    Used an illegal drug or used a prescription medication for non-medical reasons? Never Filed at: 07/03/2024 1103            Wells' Criteria for PE      Flowsheet Row Most Recent Value   Wells' Criteria for PE    Clinical signs and symptoms of DVT 0 Filed at: 07/03/2024 1116   PE is primary diagnosis or equally likely 0 Filed at: 07/03/2024 1116   HR >100 1.5 Filed at: 07/03/2024 1116   Immobilization at least 3 days or Surgery in the previous 4 weeks 0 Filed at: 07/03/2024 1116   Previous, objectively diagnosed PE or DVT 0 Filed at: 07/03/2024 1116   Hemoptysis 0 Filed at: 07/03/2024 1116   Malignancy with treatment within 6 months or palliative 0 Filed at: 07/03/2024 1116   Wells' Criteria Total 1.5 Filed at: 07/03/2024 1116                  Medical Decision Making  23 yo female presenting for evaluation of chest pain, shortness of breath.  This appears related to recent URI symptoms.  Low suspicion for cardiac etiology given age/risk factors.  Will screen for PE with d dimer in setting of  tachycardia.  Otherwise low risk by wells'.  Will provide duoneb.  Will obtain EKG, labs, viral swab. CXR vs CT scan based on labs.    Work up obtained as noted above.  EKG and serial troponins not c/w ACS.  There was an initial troponin elevation,  cardiology was consulted.  Echo was ordered and obtained.  A repeat troponin was normal on recheck.  Question a laboratory error.  CXR does not reveal pneumonia, pneumothorax, vascular congestion or pleural effusion.  No noted leukocytosis, no anemia.  No hypo or hyperglycemia.  Renal function within normal limits.  Electrolytes within normal limits.  D dimer negative, doubt PE.  Covid returned positive consistent with her respiratory symptoms.  Pt afebrile, hemodynamically stable.  Respiratory status stable on room air.  Echo was within normal limits and without acute findings.  Serial troponins x 2 negative after first, EKGs normal limits and without acute changes, likely indicating a false value.  Pt felt appropriate for discharge with symptomatic management and outpatient follow up.  Pt afebrile, nontoxic appearing.  Symptoms felt to be viral in nature.    Instructed to quarantine at home, continue social distancing, use of a mask, hand hygiene, etc.    Discussed continued symptomatic/supportive care.  Advised rest, fluids, OTC meds as needed for symptoms.      Reviewed symptomatic management.  Anticipatory guidance.  Advised recheck with PCP or return to ER as needed.  Strict return precautions outlined.  Patient voiced understanding and had no further questions.    Please refer to above ER course for further details/discussion.      Problems Addressed:  Chest pressure: acute illness or injury  COVID-19 virus infection: acute illness or injury    Amount and/or Complexity of Data Reviewed  External Data Reviewed: notes.  Labs: ordered. Decision-making details documented in ED Course.  Radiology: ordered and independent interpretation performed. Decision-making  details documented in ED Course.  ECG/medicine tests: ordered and independent interpretation performed. Decision-making details documented in ED Course.  Discussion of management or test interpretation with external provider(s): Attending, cardiology    Risk  OTC drugs.  Prescription drug management.             Disposition  Final diagnoses:   COVID-19 virus infection   Chest pressure     Time reflects when diagnosis was documented in both MDM as applicable and the Disposition within this note       Time User Action Codes Description Comment    7/3/2024  2:53 PM Chanelle Miller [U07.1] COVID-19 virus infection     7/3/2024  2:53 PM Chanelle Miller Add [R07.89] Chest pressure           ED Disposition       ED Disposition   Discharge    Condition   Stable    Date/Time   Wed Jul 3, 2024 1605    Comment   Ladi Lopez discharge to home/self care.                   Follow-up Information       Follow up With Specialties Details Why Contact Info Additional Information    [facility] Emergency Department Emergency Medicine  As needed [address] [facility] [address]  [phone] [facility] Emergency Department, [address], [place], [address]            Discharge Medication List as of 7/3/2024  4:08 PM        CONTINUE these medications which have NOT CHANGED    Details   medroxyPROGESTERone (DEPO-PROVERA) 150 mg/mL injection Inject 1 mL (150 mg total) into a muscle every 3 (three) months, Starting Thu 1/25/2024, Normal      Coal Tar Extract 1 % LOTN Apply topically 2 (two) times a day, Starting Wed 1/31/2024, Normal      triamcinolone (KENALOG) 0.1 % cream Apply topically 2 (two) times a day, Starting Tue 4/23/2024, Normal             No discharge procedures on file.    PDMP Review       None            ED Provider  Electronically Signed by             Chanelle Miller PA-C  07/03/24 [id number]

## 2024-07-03 NOTE — DISCHARGE INSTRUCTIONS
Rest, plenty of fluids.  Continue OTC medications as needed for symptoms.  Recommend daily multivitamin and vitamin D 2000 IU daily.  Follow up with PCP or return to ER as needed.

## 2024-07-03 NOTE — Clinical Note
Ladi Lopez was seen and treated in our emergency department on 7/3/2024.                Diagnosis: +covid    Ladi  .    She may return on this date: 07/09/2024         If you have any questions or concerns, please don't hesitate to call.      Chanelle Miller PA-C    ______________________________           _______________          _______________  Hospital Representative                              Date                                Time

## 2024-07-26 ENCOUNTER — OFFICE VISIT (OUTPATIENT)
Dept: FAMILY MEDICINE CLINIC | Facility: CLINIC | Age: 24
End: 2024-07-26
Payer: COMMERCIAL

## 2024-07-26 VITALS
WEIGHT: 197.8 LBS | SYSTOLIC BLOOD PRESSURE: 132 MMHG | TEMPERATURE: 99.8 F | HEART RATE: 97 BPM | OXYGEN SATURATION: 98 % | BODY MASS INDEX: 30.08 KG/M2 | DIASTOLIC BLOOD PRESSURE: 94 MMHG

## 2024-07-26 DIAGNOSIS — R07.9 CHEST PAIN OF UNCERTAIN ETIOLOGY: ICD-10-CM

## 2024-07-26 DIAGNOSIS — F41.9 ANXIETY: Primary | ICD-10-CM

## 2024-07-26 PROCEDURE — T1015 CLINIC SERVICE: HCPCS | Performed by: FAMILY MEDICINE

## 2024-07-26 NOTE — PROGRESS NOTES
FAMILY MEDICINE OFFICE VISIT  [facility]      NAME: Ladi Lopez  AGE: 24 y.o. SEX: female    DATE OF ENCOUNTER: 7/26/2024    Assessment and Plan     1. Anxiety  Comments:  GED score of 10 with moderate anxiety  recommend starting behavioral therapy  follow up in 2 months  Orders:  -     Ambulatory referral to Psych Services; Future; Expected date: 08/09/2024  2. Chest pain of uncertain etiology  Comments:  suspect secondary to anxiety episodes.  Start behavioral therapy  follow up in 2 months        Ladi Lopez is 24 y.o. female presented to the office for follow up visit from emergency room visit. She was in ER few weeks ago with chest pain on left side and found to have elevated troponins which came back to normal within few hours. EKG was normal. Echo without any evidence of pathology. She tested positive for COVID at that time. Completed home isolation. Didn't require treatment for COVID.  Reports having similar episodes of chest pain and trouble breathing, which resolved spontaneously.     Reports that she deals with anxiety problems since childhood, never had evaluation for mental health conditions. Significant history of anxiety episodes. Also reports adverse child alex experiences both personal and family. Patient didn't share specific events to consider PTSD at this time.     Based on the nature of pain episodes and significant history chest pain might be secondary to anxiety episodes. Recommend starting behavioral therapy. She denies SI HI at this time.     Follow up in 2 months.        Chief Complaint     No chief complaint on file.      History of Present Illness     Ladi Lopez is 24 y.o. female presented to the office for follow up visit from emergency room visit. She was in ER few weeks ago with chest pain on left side associated with pain in left axilla. Had work up to rule out ACS, which came back negative. She was discharged home with return precautions.     Reports that  she deals with anxiety problems since childhood, never had evaluation for mental health conditions. She reports having episodes of anxiety, when she feels anxious couldn't stop crying, feels short of breath and palpitations. Also reports adverse child alex experiences both personal and family. She is part of [facility]. Denies SI HI at this time.            The following portions of the patient's history were reviewed and updated as appropriate: allergies, current medications, past family history, past medical history, past social history, past surgical history and problem list.    Review of Systems     Review of Systems   Constitutional:  Negative for activity change, appetite change, chills, fever and unexpected weight change.   HENT:  Negative for trouble swallowing and voice change.    Eyes:  Negative for pain and visual disturbance.   Respiratory:  Negative for cough, chest tightness, shortness of breath and wheezing.    Cardiovascular:  Negative for chest pain and palpitations.   Gastrointestinal:  Negative for abdominal pain, nausea and vomiting.   Genitourinary:  Negative for difficulty urinating.   Musculoskeletal:  Negative for gait problem.   Skin:  Negative for rash.   Neurological:  Negative for light-headedness.   Psychiatric/Behavioral:  The patient is not nervous/anxious.    All other systems reviewed and are negative.      Active Problem List     Patient Active Problem List   Diagnosis    On Depo-Provera for contraception    Dyshidrotic eczema       Objective     /94   Pulse 97   Temp 99.8 °F (37.7 °C)   Wt 89.7 kg (197 lb 12.8 oz)   LMP 06/26/2024 (Approximate)   SpO2 98%   BMI 30.08 kg/m²     Physical Exam  Vitals and nursing note reviewed. Exam conducted with a chaperone present.   Constitutional:       General: She is not in acute distress.     Appearance: Normal appearance.   HENT:      Head: Normocephalic and atraumatic.      Right Ear:  External ear normal.      Left Ear: External ear normal.      Nose: No congestion or rhinorrhea.      Mouth/Throat:      Mouth: Mucous membranes are moist.      Pharynx: Oropharynx is clear.   Eyes:      Extraocular Movements: Extraocular movements intact.      Conjunctiva/sclera: Conjunctivae normal.   Cardiovascular:      Rate and Rhythm: Normal rate and regular rhythm.      Pulses: Normal pulses.      Heart sounds: Normal heart sounds. No murmur heard.  Pulmonary:      Effort: Pulmonary effort is normal.      Breath sounds: Normal breath sounds. No wheezing.   Abdominal:      General: Bowel sounds are normal.      Palpations: Abdomen is soft.      Tenderness: There is no abdominal tenderness.   Musculoskeletal:         General: Normal range of motion.      Cervical back: Neck supple.      Right lower leg: No edema.      Left lower leg: No edema.   Skin:     General: Skin is warm and dry.      Capillary Refill: Capillary refill takes less than 2 seconds.   Neurological:      General: No focal deficit present.      Mental Status: She is alert and oriented to person, place, and time.   Psychiatric:         Behavior: Behavior normal.         Current Medications     Current Outpatient Medications:     Coal Tar Extract 1 % LOTN, Apply topically 2 (two) times a day (Patient not taking: Reported on 3/26/2024), Disp: 120 mL, Rfl: 2    medroxyPROGESTERone (DEPO-PROVERA) 150 mg/mL injection, Inject 1 mL (150 mg total) into a muscle every 3 (three) months, Disp: 1 mL, Rfl: 3    triamcinolone (KENALOG) 0.1 % cream, Apply topically 2 (two) times a day (Patient not taking: Reported on 7/3/2024), Disp: 30 g, Rfl: 2    Health Maintenance     Health Maintenance   Topic Date Due    Cervical Cancer Screening  Never done    BMI: Followup Plan  05/19/2023    Chlamydia Screening  08/02/2023    COVID-19 Vaccine (7 - 2023-24 season) 09/01/2023    Influenza Vaccine (1) 09/01/2024    Annual Physical  02/02/2025    Depression Screening   07/26/2025    BMI: Adult  07/26/2025    DTaP,Tdap,and Td Vaccines (8 - Td or Tdap) 03/26/2034    Zoster Vaccine (1 of 2) 03/05/2052    RSV Vaccine Age 60+ Years (1 - 1-dose 60+ series) 03/05/2062    HIV Screening  Completed    Hepatitis C Screening  Completed    HIB Vaccine  Completed    IPV Vaccine  Completed    Hepatitis A Vaccine  Completed    Meningococcal ACWY Vaccine  Completed    HPV Vaccine  Completed    RSV Vaccine age 0-20 Months  Aged Out    Pneumococcal Vaccine: Pediatrics (0 to 5 Years) and At-Risk Patients (6 to 64 Years)  Aged Out     Immunization History   Administered Date(s) Administered    COVID-19 MODERNA VACC 0.5 ML IM 04/22/2021, 04/22/2021, 04/22/2021, 05/20/2021, 05/20/2021, 05/20/2021    DT (pediatric) 08/25/2003, 08/25/2003    DTaP 2002, 2002, 2002, 2002, 2002, 2002, 06/23/2003, 06/23/2003, 03/23/2005, 03/23/2005, 08/01/2006, 08/01/2006    HPV Bivalent 05/19/2022    HPV Quadrivalent 05/19/2022    HPV9 09/06/2023, 09/06/2023, 02/02/2024    Hep A, ped/adol, 2 dose 03/23/2005, 11/23/2005, 11/23/2005    Hep B / HiB 2002, 06/23/2003, 06/23/2003    Hep B, Adolescent or Pediatric 2002, 2002, 2002, 2002, 2002    Hepatitis A 03/23/2005, 11/23/2005    Hepatitis A, Pediatric 03/23/2005, 11/23/2005    HiB 2002, 2002, 06/23/2003    Hib (PRP-T) 2002, 2002, 2002, 2002    INFLUENZA 11/23/2005    IPV 2002, 2002, 2002, 2002, 2002, 2002, 06/23/2003, 06/23/2003, 10/06/2003, 08/01/2006, 08/01/2006    Influenza Split 2002, 09/14/2004, 09/14/2004    Influenza Whole 11/23/2005, 11/21/2006, 11/21/2006    MMR 06/23/2003, 06/23/2003, 08/01/2006, 08/01/2006    Meningococcal ACWY, unspecified 02/17/2014, 05/03/2018    Meningococcal MCV4, Unspecified 05/03/2018    Meningococcal Polysaccharide (MPSV4) 02/17/2014, 02/17/2014, 05/03/2018    OPV 10/06/2003, 10/06/2003     Pneumococcal Conjugate PCV 7 2002, 2002, 2002, 06/23/2003, 06/23/2003    Tdap 02/17/2014, 03/26/2024    Varicella 06/23/2003, 06/23/2003, 04/09/2012       Depression Screening and Follow-up Plan: Patient was screened for depression during today's encounter. They screened negative with a PHQ-2 score of 0.         Karely Connolly MD   Family Medicine  PGY- 3  7/26/2024 4:36 PM

## 2024-07-29 ENCOUNTER — TELEPHONE (OUTPATIENT)
Age: 24
End: 2024-07-29

## 2024-07-29 NOTE — TELEPHONE ENCOUNTER
Contacted patient in regards to Routine Referral in attempts to verify patient's needs of services and add patient to proper wait list. Writer left vm to call intake at [phone]    Attempt #1

## 2024-08-01 NOTE — TELEPHONE ENCOUNTER
Was calling pt in regards to routine referral and adding to proper wait list. LVM for pt to contact intake dept.

## 2024-08-13 ENCOUNTER — OFFICE VISIT (OUTPATIENT)
Dept: FAMILY MEDICINE CLINIC | Facility: CLINIC | Age: 24
End: 2024-08-13
Payer: COMMERCIAL

## 2024-08-13 VITALS
OXYGEN SATURATION: 97 % | HEART RATE: 98 BPM | SYSTOLIC BLOOD PRESSURE: 112 MMHG | DIASTOLIC BLOOD PRESSURE: 82 MMHG | TEMPERATURE: 99.6 F | BODY MASS INDEX: 29.36 KG/M2 | HEIGHT: 69 IN | WEIGHT: 198.2 LBS | RESPIRATION RATE: 18 BRPM

## 2024-08-13 DIAGNOSIS — L23.9 ALLERGIC DERMATITIS: Primary | ICD-10-CM

## 2024-08-13 PROBLEM — Z30.42 ON DEPO-PROVERA FOR CONTRACEPTION: Status: RESOLVED | Noted: 2024-01-31 | Resolved: 2024-08-13

## 2024-08-13 PROBLEM — L30.1 DYSHIDROTIC ECZEMA: Status: RESOLVED | Noted: 2024-02-01 | Resolved: 2024-08-13

## 2024-08-13 PROCEDURE — 99213 OFFICE O/P EST LOW 20 MIN: CPT | Performed by: STUDENT IN AN ORGANIZED HEALTH CARE EDUCATION/TRAINING PROGRAM

## 2024-08-13 PROCEDURE — T1015 CLINIC SERVICE: HCPCS | Performed by: FAMILY MEDICINE

## 2024-08-13 NOTE — LETTER
To whom it may concern,     Ladi Lopez was seen in my office on 8/13/24 for assessment of eczema. By my history and physical, the patient was inaccurately diagnosed with eczema and that diagnosis has been removed from her medical chart. She has no active rashes or skin lesions on my physical exam. The inciting medication that caused the initial rash has been discontinued. Please allow her to swear in as you see fit.    Sincerely,         Anila Tamez, DO

## 2024-08-13 NOTE — PROGRESS NOTES
Ambulatory Visit  Name: Ladi Lopez      : 2000      MRN: [id number]  Encounter Provider: Anila Tamez DO  Encounter Date: 2024   Encounter department: [facility]    Assessment & Plan   1. Allergic dermatitis  Comments:  -Likely 2/2 depot shot, currently resolved   -Ecezma diagnosis removed from chart   -Letter written for patient   -RTC as needed         History of Present Illness     Ladi Lopez is a 23 y/o F who presents to me for the first time for eczema. Patient states that she is going into the [employer] and they need clearance for her eczema. At this time, the patient has not active skin rashes. States that around 2024, she was referred to dermatology who diagnosed her with eczema and gave her a steroid cream which resolved the issue. Patient noted that the rash only occurred with administration of the depot shot, which she is no longer on. The rash was always in the antecubital fossa. Since she has stopped the depot shot, she has never had the rash again. Requesting letter for the [employer] explaining her diagnosis.         Review of Systems   Constitutional:  Negative for fever.   Respiratory:  Negative for cough and shortness of breath.    Cardiovascular:  Negative for chest pain.   Musculoskeletal:  Negative for gait problem.   Skin:  Negative for color change, pallor and rash.     Past Medical History:   Diagnosis Date    Allergic     GERD (gastroesophageal reflux disease)      Past Surgical History:   Procedure Laterality Date    CHOLECYSTECTOMY LAPAROSCOPIC N/A 2023    Procedure: CHOLECYSTECTOMY LAPAROSCOPIC;  Surgeon: Karen Yeh MD;  Location: [facility];  Service: General    WISDOM TOOTH EXTRACTION       Family History   Problem Relation Age of Onset    No Known Problems Mother     No Known Problems Brother      Social History     Tobacco Use    Smoking status: Never     Passive exposure: Past    Smokeless tobacco: Never   Vaping  Use    Vaping status: Former    Quit date: 7/30/2024    Substances: Nicotine, Flavoring   Substance and Sexual Activity    Alcohol use: Yes     Comment: socially    Drug use: Never    Sexual activity: Yes     Partners: Male     Birth control/protection: None     No current outpatient medications on file prior to visit.     No Known Allergies  Immunization History   Administered Date(s) Administered    COVID-19 MODERNA VACC 0.5 ML IM 04/22/2021, 04/22/2021, 04/22/2021, 05/20/2021, 05/20/2021, 05/20/2021    DT (pediatric) 08/25/2003, 08/25/2003    DTaP 2002, 2002, 2002, 2002, 2002, 2002, 06/23/2003, 06/23/2003, 03/23/2005, 03/23/2005, 08/01/2006, 08/01/2006    HPV Bivalent 05/19/2022    HPV Quadrivalent 05/19/2022    HPV9 09/06/2023, 09/06/2023, 02/02/2024    Hep A, ped/adol, 2 dose 03/23/2005, 11/23/2005, 11/23/2005    Hep B / HiB 2002, 06/23/2003, 06/23/2003    Hep B, Adolescent or Pediatric 2002, 2002, 2002, 2002, 2002    Hepatitis A 03/23/2005, 11/23/2005    Hepatitis A, Pediatric 03/23/2005, 11/23/2005    HiB 2002, 2002, 06/23/2003    Hib (PRP-T) 2002, 2002, 2002, 2002    INFLUENZA 11/23/2005    IPV 2002, 2002, 2002, 2002, 2002, 2002, 06/23/2003, 06/23/2003, 10/06/2003, 08/01/2006, 08/01/2006    Influenza Split 2002, 09/14/2004, 09/14/2004    Influenza Whole 11/23/2005, 11/21/2006, 11/21/2006    MMR 06/23/2003, 06/23/2003, 08/01/2006, 08/01/2006    Meningococcal ACWY, unspecified 02/17/2014, 05/03/2018    Meningococcal MCV4, Unspecified 05/03/2018    Meningococcal Polysaccharide (MPSV4) 02/17/2014, 02/17/2014, 05/03/2018    OPV 10/06/2003, 10/06/2003    Pneumococcal Conjugate PCV 7 2002, 2002, 2002, 06/23/2003, 06/23/2003    Tdap 02/17/2014, 03/26/2024    Varicella 06/23/2003, 06/23/2003, 04/09/2012     Objective     /82 (BP Location: Left  "arm, Patient Position: Sitting, Cuff Size: Large)   Pulse 98   Temp 99.6 °F (37.6 °C) (Tympanic)   Resp 18   Ht 5' 9\" (1.753 m)   Wt 89.9 kg (198 lb 3.2 oz)   LMP  (LMP Unknown)   SpO2 97%   BMI 29.27 kg/m²     Physical Exam  Vitals reviewed.   Constitutional:       General: She is not in acute distress.     Appearance: Normal appearance. She is not ill-appearing or toxic-appearing.   HENT:      Head: Normocephalic and atraumatic.      Nose: Nose normal.   Eyes:      Conjunctiva/sclera: Conjunctivae normal.   Pulmonary:      Effort: Pulmonary effort is normal. No respiratory distress.   Skin:     General: Skin is warm and dry.      Findings: No erythema, lesion or rash.      Comments: No rashes, erythema, or gross changes noted to the skin of the arms, legs, face, or back   Neurological:      General: No focal deficit present.      Mental Status: She is alert and oriented to person, place, and time.      Gait: Gait normal.   Psychiatric:         Mood and Affect: Mood normal.         Behavior: Behavior normal.         Thought Content: Thought content normal.       Anila Tamez DO   PGY-2 Rural  Residency   [facility]   "

## 2024-08-15 ENCOUNTER — TELEPHONE (OUTPATIENT)
Dept: OTHER | Facility: OTHER | Age: 24
End: 2024-08-15

## 2024-08-15 NOTE — TELEPHONE ENCOUNTER
Patient is calling regarding cancelling an appointment.    Date/Time: 8/15/2024 / 8:00 am    Patient was rescheduled: YES [] NO [x]    Patient requesting call back to reschedule: YES [x] NO []

## 2024-08-20 ENCOUNTER — TELEPHONE (OUTPATIENT)
Dept: FAMILY MEDICINE CLINIC | Facility: CLINIC | Age: 24
End: 2024-08-20

## 2024-08-20 ENCOUNTER — TELEPHONE (OUTPATIENT)
Dept: ADMINISTRATIVE | Facility: OTHER | Age: 24
End: 2024-08-20

## 2024-08-20 NOTE — TELEPHONE ENCOUNTER
Upon review of the In Basket request we were able to note that no further action is required. The patient chart is up to date as a result of a previous request.      Any additional questions or concerns should be emailed to the Practice Liaisons via the appropriate education email address, please do not reply via In Basket.    Thank you  Mathieu Carrington MA   [facility]

## 2024-08-20 NOTE — TELEPHONE ENCOUNTER
----- Message from Clayton CARBONE sent at 8/19/2024 12:48 PM EDT -----  08/19/24 12:48 PM    Hello, our patient Ladi Lopez has had Chlamydia completed/performed. Please assist in updating the patient chart by pulling the Care Everywhere (CE) document. The date of service is 8/2/22.     Thank you,  Clayton Moon MA  [facility]

## 2024-08-21 ENCOUNTER — TELEPHONE (OUTPATIENT)
Dept: FAMILY MEDICINE CLINIC | Facility: CLINIC | Age: 24
End: 2024-08-21

## 2024-08-21 NOTE — TELEPHONE ENCOUNTER
Received message from patient stating she needed to reschedule appt. Called back patient; she states someone else from the office called to reschedule appt. She also requested to cancel appt she had scheduled for 10/4 since she won't be able to make this appt. [place] out of the [place] and will call back office if needed for further appts.

## 2024-09-05 ENCOUNTER — VBI (OUTPATIENT)
Dept: ADMINISTRATIVE | Facility: OTHER | Age: 24
End: 2024-09-05

## 2024-09-05 NOTE — TELEPHONE ENCOUNTER
09/05/24 8:34 AM     Chart reviewed for Chlamydia was/were not submitted to the patient's insurance.     Nehal Mendoza MA   [facility]

## 2024-09-17 ENCOUNTER — TELEPHONE (OUTPATIENT)
Dept: DENTISTRY | Facility: CLINIC | Age: 24
End: 2024-09-17

## 2024-09-17 NOTE — TELEPHONE ENCOUNTER
Called to offer appt with Piedad on 9/18 @ 12:30pm since she's on a list to call when we have an opening due to our cancelling appt on 8/7/24.   LEYLA

## 2024-09-24 ENCOUNTER — TELEPHONE (OUTPATIENT)
Dept: DENTISTRY | Facility: CLINIC | Age: 24
End: 2024-09-24

## 2024-11-09 ENCOUNTER — VBI (OUTPATIENT)
Dept: ADMINISTRATIVE | Facility: OTHER | Age: 24
End: 2024-11-09

## 2024-11-09 NOTE — TELEPHONE ENCOUNTER
11/09/24 8:44 AM     Chart reviewed for Chlamydia was/were not submitted to the patient's insurance.     Nehal Mendoza MA   [facility]

## 2024-11-13 ENCOUNTER — TELEPHONE (OUTPATIENT)
Dept: DENTISTRY | Facility: CLINIC | Age: 24
End: 2024-11-13

## 2024-11-16 ENCOUNTER — VBI (OUTPATIENT)
Dept: ADMINISTRATIVE | Facility: OTHER | Age: 24
End: 2024-11-16

## 2024-11-16 NOTE — TELEPHONE ENCOUNTER
11/16/24 1:02 PM     Chart reviewed for Chlamydia was/were not submitted to the patient's insurance.     Nehal Mendoza MA   [facility]

## 2024-12-17 ENCOUNTER — OFFICE VISIT (OUTPATIENT)
Dept: URGENT CARE | Facility: MEDICAL CENTER | Age: 24
End: 2024-12-17
Payer: COMMERCIAL

## 2024-12-17 VITALS
HEIGHT: 69 IN | RESPIRATION RATE: 18 BRPM | TEMPERATURE: 98.8 F | OXYGEN SATURATION: 98 % | DIASTOLIC BLOOD PRESSURE: 83 MMHG | WEIGHT: 189 LBS | HEART RATE: 103 BPM | SYSTOLIC BLOOD PRESSURE: 118 MMHG | BODY MASS INDEX: 27.99 KG/M2

## 2024-12-17 DIAGNOSIS — R39.9 UTI SYMPTOMS: Primary | ICD-10-CM

## 2024-12-17 LAB
SL AMB  POCT GLUCOSE, UA: ABNORMAL
SL AMB LEUKOCYTE ESTERASE,UA: ABNORMAL
SL AMB POCT BILIRUBIN,UA: ABNORMAL
SL AMB POCT BLOOD,UA: ABNORMAL
SL AMB POCT CLARITY,UA: ABNORMAL
SL AMB POCT COLOR,UA: ABNORMAL
SL AMB POCT KETONES,UA: ABNORMAL
SL AMB POCT NITRITE,UA: ABNORMAL
SL AMB POCT PH,UA: 6.5
SL AMB POCT SPECIFIC GRAVITY,UA: 1.01
SL AMB POCT URINE PROTEIN: ABNORMAL
SL AMB POCT UROBILINOGEN: 0.2

## 2024-12-17 PROCEDURE — 81002 URINALYSIS NONAUTO W/O SCOPE: CPT | Performed by: STUDENT IN AN ORGANIZED HEALTH CARE EDUCATION/TRAINING PROGRAM

## 2024-12-17 PROCEDURE — S9088 SERVICES PROVIDED IN URGENT: HCPCS | Performed by: STUDENT IN AN ORGANIZED HEALTH CARE EDUCATION/TRAINING PROGRAM

## 2024-12-17 PROCEDURE — 87186 SC STD MICRODIL/AGAR DIL: CPT | Performed by: STUDENT IN AN ORGANIZED HEALTH CARE EDUCATION/TRAINING PROGRAM

## 2024-12-17 PROCEDURE — 99213 OFFICE O/P EST LOW 20 MIN: CPT | Performed by: STUDENT IN AN ORGANIZED HEALTH CARE EDUCATION/TRAINING PROGRAM

## 2024-12-17 PROCEDURE — 87086 URINE CULTURE/COLONY COUNT: CPT | Performed by: STUDENT IN AN ORGANIZED HEALTH CARE EDUCATION/TRAINING PROGRAM

## 2024-12-17 PROCEDURE — 87077 CULTURE AEROBIC IDENTIFY: CPT | Performed by: STUDENT IN AN ORGANIZED HEALTH CARE EDUCATION/TRAINING PROGRAM

## 2024-12-17 RX ORDER — CEPHALEXIN 500 MG/1
500 CAPSULE ORAL EVERY 12 HOURS SCHEDULED
Qty: 14 CAPSULE | Refills: 0 | Status: SHIPPED | OUTPATIENT
Start: 2024-12-17 | End: 2024-12-24

## 2024-12-17 NOTE — PATIENT INSTRUCTIONS
Supportive care includes increasing hydration to flush  system and avoidance of bladder irritants such as citrus, caffeine, chocolate and coffee.  Encourage use of over-the-counter Azo for symptom relief.    There are multiple conditions that can mimic UTI symptoms including STDs, bacterial vaginosis, yeast infections, kidney stones and very rarely bladder cancer.  If urine culture does not reveal bacteria or if you continue to have symptoms after finishing antibiotics, you should follow-up with primary care doctor for further evaluation.    If you develop worsening flank pain or fevers, this should be evaluated in the emergency room as these are signs of pyelonephritis.    If tests have been performed at [facility], our office will contact you with results if changes need to be made to the care plan discussed with you at the visit.  You can review your full results on [facility] MyChart.    Follow up with PCP.     If any of the following occur, please report to your nearest ED for evaluation or call 911.   Difficultly breathing or shortness of breath  Chest pain  Acutely worsening symptoms.

## 2024-12-17 NOTE — PROGRESS NOTES
[facility]        NAME: Ladi Lopez is a 24 y.o. female  : 2000    MRN: [id number]  DATE: 2024  TIME: 1:45 PM    Assessment and Orders   UTI symptoms [R39.9]  1. UTI symptoms  POCT urine dip    Urine culture    Urine culture    cephalexin (KEFLEX) 500 mg capsule            Plan and Discussion      UA positive for large leukocytes and blood. Has had positive urine cultures in the past. Will treat with oral Keflex.     Risks and benefits discussed. Patient understands and agrees with the plan.     PATIENT INSTRUCTIONS    Supportive care includes increasing hydration to flush  system and avoidance of bladder irritants such as citrus, caffeine, chocolate and coffee.  Encourage use of over-the-counter Azo for symptom relief.    There are multiple conditions that can mimic UTI symptoms including STDs, bacterial vaginosis, yeast infections, kidney stones and very rarely bladder cancer.  If urine culture does not reveal bacteria or if you continue to have symptoms after finishing antibiotics, you should follow-up with primary care doctor for further evaluation.    If you develop worsening flank pain or fevers, this should be evaluated in the emergency room as these are signs of pyelonephritis.        If tests have been performed at [facility], our office will contact you with results if changes need to be made to the care plan discussed with you at the visit.  [facility]    Follow up with PCP.     If any of the following occur, please report to your nearest ED for evaluation or call 911.   Difficultly breathing or shortness of breath  Chest pain  Acutely worsening symptoms.         Chief Complaint     Chief Complaint   Patient presents with    Possible UTI     Pt states symptoms began yesterday morning cloudy urine w/foul smell, this morning burning sensation when urinating, small amounts of urine when urinating, nothing taken for symptoms, pt has  "hx of kidney infections also         History of Present Illness       Urinary Tract Infection   This is a new problem. The current episode started yesterday. The problem occurs every urination. The problem has been unchanged. The quality of the pain is described as burning. There has been no fever. Associated symptoms include frequency, hesitancy and urgency.       Review of Systems   Review of Systems   Genitourinary:  Positive for frequency, hesitancy and urgency.         Current Medications       Current Outpatient Medications:     cephalexin (KEFLEX) 500 mg capsule, Take 1 capsule (500 mg total) by mouth every 12 (twelve) hours for 7 days, Disp: 14 capsule, Rfl: 0    Current Allergies     Allergies as of 12/17/2024    (No Known Allergies)            The following portions of the patient's history were reviewed and updated as appropriate: allergies, current medications, past family history, past medical history, past social history, past surgical history and problem list.     Past Medical History:   Diagnosis Date    Allergic     GERD (gastroesophageal reflux disease)        Past Surgical History:   Procedure Laterality Date    CHOLECYSTECTOMY LAPAROSCOPIC N/A 5/24/2023    Procedure: CHOLECYSTECTOMY LAPAROSCOPIC;  Surgeon: Karen Yeh MD;  Location: [facility];  Service: General    WISDOM TOOTH EXTRACTION         Family History   Problem Relation Age of Onset    No Known Problems Mother     No Known Problems Brother          Medications have been verified.        Objective   /83   Pulse 103   Temp 98.8 °F (37.1 °C)   Resp 18   Ht 5' 9\" (1.753 m)   Wt 85.7 kg (189 lb)   LMP 12/08/2024   SpO2 98%   BMI 27.91 kg/m²   Patient's last menstrual period was 12/08/2024.       Physical Exam     Physical Exam  Constitutional:       General: She is not in acute distress.     Appearance: She is not ill-appearing.   HENT:      Head: Normocephalic and atraumatic.      Right Ear: External ear normal.      " Left Ear: External ear normal.      Nose: Nose normal.   Cardiovascular:      Rate and Rhythm: Normal rate and regular rhythm.   Pulmonary:      Effort: Pulmonary effort is normal. No respiratory distress.   Abdominal:      General: There is no distension.      Tenderness: There is no abdominal tenderness. There is no right CVA tenderness or left CVA tenderness.   Skin:     General: Skin is warm and dry.   Neurological:      General: No focal deficit present.      Mental Status: She is alert and oriented to person, place, and time.   Psychiatric:         Mood and Affect: Mood normal.         Behavior: Behavior normal.         Thought Content: Thought content normal.         Judgment: Judgment normal.               Johana Park DO

## 2024-12-19 ENCOUNTER — RESULTS FOLLOW-UP (OUTPATIENT)
Dept: URGENT CARE | Facility: MEDICAL CENTER | Age: 24
End: 2024-12-19

## 2024-12-19 LAB
BACTERIA UR CULT: ABNORMAL
BACTERIA UR CULT: ABNORMAL

## 2025-01-16 ENCOUNTER — TELEPHONE (OUTPATIENT)
Dept: DENTISTRY | Facility: CLINIC | Age: 25
End: 2025-01-16

## 2025-06-06 ENCOUNTER — VBI (OUTPATIENT)
Dept: ADMINISTRATIVE | Facility: OTHER | Age: 25
End: 2025-06-06

## 2025-06-06 NOTE — TELEPHONE ENCOUNTER
06/06/25 9:53 AM     Chart reviewed for Child and Adolescent Well-Care Visits was/were not submitted to the patient's insurance.     Nehal Mendoza MA   [facility]

## (undated) DEVICE — 3M™ STERI-STRIP™ REINFORCED ADHESIVE SKIN CLOSURES, R1542, 1/4 IN X 1-1/2 IN (6 MM X 38 MM), 6 STRIPS/ENVELOPE: Brand: 3M™ STERI-STRIP™

## (undated) DEVICE — [HIGH FLOW INSUFFLATOR,  DO NOT USE IF PACKAGE IS DAMAGED,  KEEP DRY,  KEEP AWAY FROM SUNLIGHT,  PROTECT FROM HEAT AND RADIOACTIVE SOURCES.]: Brand: PNEUMOSURE

## (undated) DEVICE — ALLENTOWN LAP CHOLE APP PACK: Brand: CARDINAL HEALTH

## (undated) DEVICE — CHLORAPREP HI-LITE 26ML ORANGE

## (undated) DEVICE — 5 MM CURVED DISSECTORS WITH MONOPOLAR CAUTERY: Brand: ENDOPATH

## (undated) DEVICE — SUT VICRYL 0 UR-6 27 IN J603H

## (undated) DEVICE — SUT MONOCRYL 4-0 SH 27 IN Y315H

## (undated) DEVICE — TROCAR: Brand: KII FIOS FIRST ENTRY

## (undated) DEVICE — IRRIG ENDO FLO TUBING

## (undated) DEVICE — 4-PORT MANIFOLD: Brand: NEPTUNE 2

## (undated) DEVICE — ELECTRODE LAP L WIRE E-Z CLEAN 33CM -0100

## (undated) DEVICE — INTENDED FOR TISSUE SEPARATION, AND OTHER PROCEDURES THAT REQUIRE A SHARP SURGICAL BLADE TO PUNCTURE OR CUT.: Brand: BARD-PARKER SAFETY BLADES SIZE 11, STERILE

## (undated) DEVICE — TISSUE RETRIEVAL SYSTEM: Brand: INZII RETRIEVAL SYSTEM

## (undated) DEVICE — TUBING SMOKE EVAC W/FILTRATION DEVICE PLUMEPORT ACTIV

## (undated) DEVICE — GAUZE SPONGES,8 PLY: Brand: CURITY

## (undated) DEVICE — LIGAMAX 5 MM ENDOSCOPIC MULTIPLE CLIP APPLIER: Brand: LIGAMAX

## (undated) DEVICE — DRAPE EQUIPMENT RF WAND

## (undated) DEVICE — 3M™ STERI-STRIP™ REINFORCED ADHESIVE SKIN CLOSURES, R1546, 1/4 IN X 4 IN (6 MM X 100 MM), 10 STRIPS/ENVELOPE: Brand: 3M™ STERI-STRIP™

## (undated) DEVICE — TROCARS: Brand: KII® BALLOON BLUNT TIP SYSTEM

## (undated) DEVICE — METZENBAUM ADTEC SINGLE USE DISSECTING SCISSORS, SHAFT ONLY, MONOPOLAR, CURVED TO LEFT, WORKING LENGTH: 12 1/4", (310 MM), DIAM. 5 MM, INSULATED, DOUBLE ACTION, STERILE, DISPOSABLE, PACKAGE OF 10 PIECES: Brand: AESCULAP

## (undated) DEVICE — 3M™ TEGADERM™ TRANSPARENT FILM DRESSING FRAME STYLE, 1624W, 2-3/8 IN X 2-3/4 IN (6 CM X 7 CM), 100/CT 4CT/CASE: Brand: 3M™ TEGADERM™

## (undated) DEVICE — TOWEL SET X-RAY

## (undated) DEVICE — PAD GROUNDING ADULT

## (undated) DEVICE — TROCAR: Brand: KII® SLEEVE

## (undated) DEVICE — NEPTUNE E-SEP SMOKE EVACUATION PENCIL, COATED, 70MM BLADE, PUSH BUTTON SWITCH: Brand: NEPTUNE E-SEP

## (undated) DEVICE — GLOVE SRG BIOGEL 7

## (undated) DEVICE — SCD SEQUENTIAL COMPRESSION COMFORT SLEEVE MEDIUM KNEE LENGTH: Brand: KENDALL SCD